# Patient Record
Sex: FEMALE | Race: WHITE | HISPANIC OR LATINO | Employment: UNEMPLOYED | URBAN - METROPOLITAN AREA
[De-identification: names, ages, dates, MRNs, and addresses within clinical notes are randomized per-mention and may not be internally consistent; named-entity substitution may affect disease eponyms.]

---

## 2021-09-16 ENCOUNTER — OFFICE VISIT (OUTPATIENT)
Dept: OBSTETRICS AND GYNECOLOGY | Facility: CLINIC | Age: 26
End: 2021-09-16
Payer: MEDICAID

## 2021-09-16 VITALS — WEIGHT: 136.69 LBS | DIASTOLIC BLOOD PRESSURE: 60 MMHG | SYSTOLIC BLOOD PRESSURE: 102 MMHG

## 2021-09-16 DIAGNOSIS — Z11.3 SCREENING FOR STD (SEXUALLY TRANSMITTED DISEASE): ICD-10-CM

## 2021-09-16 DIAGNOSIS — Z12.4 CERVICAL CANCER SCREENING: ICD-10-CM

## 2021-09-16 DIAGNOSIS — N91.2 AMENORRHEA: Primary | ICD-10-CM

## 2021-09-16 PROCEDURE — 88141 CYTOPATH C/V INTERPRET: CPT | Mod: ,,, | Performed by: PATHOLOGY

## 2021-09-16 PROCEDURE — 88141 PR  CYTOPATH CERV/VAG INTERPRET: ICD-10-PCS | Mod: ,,, | Performed by: PATHOLOGY

## 2021-09-16 PROCEDURE — 88175 CYTOPATH C/V AUTO FLUID REDO: CPT | Performed by: PATHOLOGY

## 2021-09-16 PROCEDURE — 99999 PR PBB SHADOW E&M-NEW PATIENT-LVL II: ICD-10-PCS | Mod: PBBFAC,,, | Performed by: OBSTETRICS & GYNECOLOGY

## 2021-09-16 PROCEDURE — 99202 OFFICE O/P NEW SF 15 MIN: CPT | Mod: PBBFAC,PO | Performed by: OBSTETRICS & GYNECOLOGY

## 2021-09-16 PROCEDURE — 87481 CANDIDA DNA AMP PROBE: CPT | Mod: 59 | Performed by: OBSTETRICS & GYNECOLOGY

## 2021-09-16 PROCEDURE — 87491 CHLMYD TRACH DNA AMP PROBE: CPT | Mod: 59 | Performed by: OBSTETRICS & GYNECOLOGY

## 2021-09-16 PROCEDURE — 99203 PR OFFICE/OUTPT VISIT, NEW, LEVL III, 30-44 MIN: ICD-10-PCS | Mod: S$PBB,,, | Performed by: OBSTETRICS & GYNECOLOGY

## 2021-09-16 PROCEDURE — 99203 OFFICE O/P NEW LOW 30 MIN: CPT | Mod: S$PBB,,, | Performed by: OBSTETRICS & GYNECOLOGY

## 2021-09-16 PROCEDURE — 87591 N.GONORRHOEAE DNA AMP PROB: CPT | Mod: 59 | Performed by: OBSTETRICS & GYNECOLOGY

## 2021-09-16 PROCEDURE — 87624 HPV HI-RISK TYP POOLED RSLT: CPT | Performed by: OBSTETRICS & GYNECOLOGY

## 2021-09-16 PROCEDURE — 87086 URINE CULTURE/COLONY COUNT: CPT | Performed by: OBSTETRICS & GYNECOLOGY

## 2021-09-16 PROCEDURE — 99999 PR PBB SHADOW E&M-NEW PATIENT-LVL II: CPT | Mod: PBBFAC,,, | Performed by: OBSTETRICS & GYNECOLOGY

## 2021-09-16 RX ORDER — PYRIDOXINE HCL (VITAMIN B6) 100 MG
100 TABLET ORAL
Qty: 60 TABLET | Refills: 3 | COMMUNITY
Start: 2021-09-16

## 2021-09-16 RX ORDER — METRONIDAZOLE 500 MG/1
500 TABLET ORAL EVERY 12 HOURS
Qty: 14 TABLET | Refills: 0 | Status: SHIPPED | OUTPATIENT
Start: 2021-09-16 | End: 2021-09-23

## 2021-09-16 RX ORDER — PROMETHAZINE HYDROCHLORIDE 25 MG/1
25 TABLET ORAL EVERY 6 HOURS PRN
Qty: 25 TABLET | Refills: 0 | Status: SHIPPED | OUTPATIENT
Start: 2021-09-16

## 2021-09-17 LAB
BACTERIA UR CULT: NORMAL
BACTERIAL VAGINOSIS DNA: POSITIVE
C TRACH DNA SPEC QL NAA+PROBE: NOT DETECTED
CANDIDA GLABRATA DNA: NEGATIVE
CANDIDA KRUSEI DNA: NEGATIVE
CANDIDA RRNA VAG QL PROBE: NEGATIVE
N GONORRHOEA DNA SPEC QL NAA+PROBE: NOT DETECTED
T VAGINALIS RRNA GENITAL QL PROBE: NEGATIVE

## 2021-09-20 ENCOUNTER — TELEPHONE (OUTPATIENT)
Dept: OBSTETRICS AND GYNECOLOGY | Facility: HOSPITAL | Age: 26
End: 2021-09-20

## 2021-09-20 RX ORDER — METRONIDAZOLE 500 MG/1
500 TABLET ORAL EVERY 12 HOURS
Qty: 14 TABLET | Refills: 0 | Status: SHIPPED | OUTPATIENT
Start: 2021-09-20 | End: 2021-09-27

## 2021-09-23 LAB
FINAL PATHOLOGIC DIAGNOSIS: ABNORMAL
Lab: ABNORMAL

## 2021-09-28 LAB
HPV HR 12 DNA SPEC QL NAA+PROBE: NEGATIVE
HPV16 AG SPEC QL: POSITIVE
HPV18 DNA SPEC QL NAA+PROBE: NEGATIVE

## 2021-10-07 ENCOUNTER — PROCEDURE VISIT (OUTPATIENT)
Dept: OBSTETRICS AND GYNECOLOGY | Facility: CLINIC | Age: 26
End: 2021-10-07
Payer: MEDICAID

## 2021-10-07 DIAGNOSIS — Z36.89 ESTABLISH GESTATIONAL AGE, ULTRASOUND: ICD-10-CM

## 2021-10-07 DIAGNOSIS — N91.2 AMENORRHEA: ICD-10-CM

## 2021-10-07 PROCEDURE — 76816 PR  US,PREGNANT UTERUS,F/U,TRANSABD APP: ICD-10-PCS | Mod: 26,S$PBB,, | Performed by: OBSTETRICS & GYNECOLOGY

## 2021-10-07 PROCEDURE — 76816 OB US FOLLOW-UP PER FETUS: CPT | Mod: 26,S$PBB,, | Performed by: OBSTETRICS & GYNECOLOGY

## 2021-10-07 PROCEDURE — 76816 OB US FOLLOW-UP PER FETUS: CPT | Mod: PBBFAC,PO | Performed by: OBSTETRICS & GYNECOLOGY

## 2021-10-14 ENCOUNTER — ROUTINE PRENATAL (OUTPATIENT)
Dept: OBSTETRICS AND GYNECOLOGY | Facility: CLINIC | Age: 26
End: 2021-10-14
Payer: MEDICAID

## 2021-10-14 ENCOUNTER — LAB VISIT (OUTPATIENT)
Dept: LAB | Facility: HOSPITAL | Age: 26
End: 2021-10-14
Attending: OBSTETRICS & GYNECOLOGY
Payer: MEDICAID

## 2021-10-14 VITALS — SYSTOLIC BLOOD PRESSURE: 112 MMHG | WEIGHT: 140 LBS | DIASTOLIC BLOOD PRESSURE: 80 MMHG

## 2021-10-14 DIAGNOSIS — N87.9 CERVICAL DYSPLASIA: Primary | ICD-10-CM

## 2021-10-14 DIAGNOSIS — N91.2 AMENORRHEA: ICD-10-CM

## 2021-10-14 DIAGNOSIS — Z3A.20 20 WEEKS GESTATION OF PREGNANCY: ICD-10-CM

## 2021-10-14 LAB
ABO + RH BLD: NORMAL
ANION GAP SERPL CALC-SCNC: 6 MMOL/L (ref 8–16)
BASOPHILS # BLD AUTO: 0.02 K/UL (ref 0–0.2)
BASOPHILS NFR BLD: 0.3 % (ref 0–1.9)
BLD GP AB SCN CELLS X3 SERPL QL: NORMAL
BUN SERPL-MCNC: 6 MG/DL (ref 6–20)
CALCIUM SERPL-MCNC: 9 MG/DL (ref 8.7–10.5)
CHLORIDE SERPL-SCNC: 106 MMOL/L (ref 95–110)
CO2 SERPL-SCNC: 24 MMOL/L (ref 23–29)
CREAT SERPL-MCNC: 0.6 MG/DL (ref 0.5–1.4)
DIFFERENTIAL METHOD: ABNORMAL
EOSINOPHIL # BLD AUTO: 0.1 K/UL (ref 0–0.5)
EOSINOPHIL NFR BLD: 1.6 % (ref 0–8)
ERYTHROCYTE [DISTWIDTH] IN BLOOD BY AUTOMATED COUNT: 14 % (ref 11.5–14.5)
EST. GFR  (AFRICAN AMERICAN): >60 ML/MIN/1.73 M^2
EST. GFR  (NON AFRICAN AMERICAN): >60 ML/MIN/1.73 M^2
ESTIMATED AVG GLUCOSE: 85 MG/DL (ref 68–131)
GLUCOSE SERPL-MCNC: 52 MG/DL (ref 70–110)
HBA1C MFR BLD: 4.6 % (ref 4–5.6)
HCT VFR BLD AUTO: 36.4 % (ref 37–48.5)
HGB BLD-MCNC: 12.6 G/DL (ref 12–16)
IMM GRANULOCYTES # BLD AUTO: 0.01 K/UL (ref 0–0.04)
IMM GRANULOCYTES NFR BLD AUTO: 0.1 % (ref 0–0.5)
LYMPHOCYTES # BLD AUTO: 1.3 K/UL (ref 1–4.8)
LYMPHOCYTES NFR BLD: 19 % (ref 18–48)
MCH RBC QN AUTO: 31.1 PG (ref 27–31)
MCHC RBC AUTO-ENTMCNC: 34.6 G/DL (ref 32–36)
MCV RBC AUTO: 90 FL (ref 82–98)
MONOCYTES # BLD AUTO: 0.8 K/UL (ref 0.3–1)
MONOCYTES NFR BLD: 11.5 % (ref 4–15)
NEUTROPHILS # BLD AUTO: 4.7 K/UL (ref 1.8–7.7)
NEUTROPHILS NFR BLD: 67.5 % (ref 38–73)
NRBC BLD-RTO: 0 /100 WBC
PLATELET # BLD AUTO: 278 K/UL (ref 150–450)
PMV BLD AUTO: 10.7 FL (ref 9.2–12.9)
POTASSIUM SERPL-SCNC: 3.9 MMOL/L (ref 3.5–5.1)
RBC # BLD AUTO: 4.05 M/UL (ref 4–5.4)
SODIUM SERPL-SCNC: 136 MMOL/L (ref 136–145)
WBC # BLD AUTO: 6.94 K/UL (ref 3.9–12.7)

## 2021-10-14 PROCEDURE — 99999 PR PBB SHADOW E&M-EST. PATIENT-LVL II: ICD-10-PCS | Mod: PBBFAC,,, | Performed by: OBSTETRICS & GYNECOLOGY

## 2021-10-14 PROCEDURE — 36415 COLL VENOUS BLD VENIPUNCTURE: CPT | Performed by: OBSTETRICS & GYNECOLOGY

## 2021-10-14 PROCEDURE — 88342 CHG IMMUNOCYTOCHEMISTRY: ICD-10-PCS | Mod: 26,,, | Performed by: PATHOLOGY

## 2021-10-14 PROCEDURE — 83020 HEMOGLOBIN ELECTROPHORESIS: CPT | Performed by: OBSTETRICS & GYNECOLOGY

## 2021-10-14 PROCEDURE — 57455 BIOPSY OF CERVIX W/SCOPE: CPT | Mod: S$PBB,,, | Performed by: OBSTETRICS & GYNECOLOGY

## 2021-10-14 PROCEDURE — 87340 HEPATITIS B SURFACE AG IA: CPT | Performed by: OBSTETRICS & GYNECOLOGY

## 2021-10-14 PROCEDURE — 88342 IMHCHEM/IMCYTCHM 1ST ANTB: CPT | Mod: 26,,, | Performed by: PATHOLOGY

## 2021-10-14 PROCEDURE — 88305 TISSUE EXAM BY PATHOLOGIST: ICD-10-PCS | Mod: 26,,, | Performed by: PATHOLOGY

## 2021-10-14 PROCEDURE — 87389 HIV-1 AG W/HIV-1&-2 AB AG IA: CPT | Performed by: OBSTETRICS & GYNECOLOGY

## 2021-10-14 PROCEDURE — 57455 BIOPSY OF CERVIX W/SCOPE: CPT | Mod: PBBFAC,PO | Performed by: OBSTETRICS & GYNECOLOGY

## 2021-10-14 PROCEDURE — 83036 HEMOGLOBIN GLYCOSYLATED A1C: CPT | Performed by: OBSTETRICS & GYNECOLOGY

## 2021-10-14 PROCEDURE — 80048 BASIC METABOLIC PNL TOTAL CA: CPT | Performed by: OBSTETRICS & GYNECOLOGY

## 2021-10-14 PROCEDURE — 99213 PR OFFICE/OUTPT VISIT, EST, LEVL III, 20-29 MIN: ICD-10-PCS | Mod: S$PBB,25,, | Performed by: OBSTETRICS & GYNECOLOGY

## 2021-10-14 PROCEDURE — 57455 PR COLPOSCOPY,CERVIX W/ADJ VAGINA,W/BX: ICD-10-PCS | Mod: S$PBB,,, | Performed by: OBSTETRICS & GYNECOLOGY

## 2021-10-14 PROCEDURE — 88342 IMHCHEM/IMCYTCHM 1ST ANTB: CPT | Performed by: PATHOLOGY

## 2021-10-14 PROCEDURE — 99212 OFFICE O/P EST SF 10 MIN: CPT | Mod: PBBFAC,PO,25 | Performed by: OBSTETRICS & GYNECOLOGY

## 2021-10-14 PROCEDURE — 88305 TISSUE EXAM BY PATHOLOGIST: CPT | Performed by: PATHOLOGY

## 2021-10-14 PROCEDURE — 99213 OFFICE O/P EST LOW 20 MIN: CPT | Mod: S$PBB,25,, | Performed by: OBSTETRICS & GYNECOLOGY

## 2021-10-14 PROCEDURE — 86592 SYPHILIS TEST NON-TREP QUAL: CPT | Performed by: OBSTETRICS & GYNECOLOGY

## 2021-10-14 PROCEDURE — 86900 BLOOD TYPING SEROLOGIC ABO: CPT | Performed by: OBSTETRICS & GYNECOLOGY

## 2021-10-14 PROCEDURE — 85025 COMPLETE CBC W/AUTO DIFF WBC: CPT | Performed by: OBSTETRICS & GYNECOLOGY

## 2021-10-14 PROCEDURE — 86762 RUBELLA ANTIBODY: CPT | Performed by: OBSTETRICS & GYNECOLOGY

## 2021-10-14 PROCEDURE — 88305 TISSUE EXAM BY PATHOLOGIST: CPT | Mod: 26,,, | Performed by: PATHOLOGY

## 2021-10-14 PROCEDURE — 99999 PR PBB SHADOW E&M-EST. PATIENT-LVL II: CPT | Mod: PBBFAC,,, | Performed by: OBSTETRICS & GYNECOLOGY

## 2021-10-14 PROCEDURE — 81220 CFTR GENE COM VARIANTS: CPT | Performed by: OBSTETRICS & GYNECOLOGY

## 2021-10-15 LAB
HBV SURFACE AG SERPL QL IA: NEGATIVE
HIV 1+2 AB+HIV1 P24 AG SERPL QL IA: NEGATIVE
RPR SER QL: NORMAL
RUBV IGG SER-ACNC: 120 IU/ML
RUBV IGG SER-IMP: REACTIVE

## 2021-10-18 LAB — CFTR MUT ANL BLD/T: NORMAL

## 2021-10-20 LAB
HGB A2 MFR BLD HPLC: 3.1 % (ref 2.2–3.2)
HGB FRACT BLD ELPH-IMP: NORMAL
HGB FRACT BLD ELPH-IMP: NORMAL

## 2021-10-22 LAB
COMMENT: NORMAL
FINAL PATHOLOGIC DIAGNOSIS: NORMAL
GROSS: NORMAL
Lab: NORMAL

## 2021-11-03 ENCOUNTER — TELEPHONE (OUTPATIENT)
Dept: OBSTETRICS AND GYNECOLOGY | Facility: CLINIC | Age: 26
End: 2021-11-03
Payer: MEDICAID

## 2021-11-04 ENCOUNTER — PROCEDURE VISIT (OUTPATIENT)
Dept: OBSTETRICS AND GYNECOLOGY | Facility: CLINIC | Age: 26
End: 2021-11-04
Payer: MEDICAID

## 2021-11-04 DIAGNOSIS — Z3A.20 20 WEEKS GESTATION OF PREGNANCY: ICD-10-CM

## 2021-11-04 PROCEDURE — 76805 OB US >/= 14 WKS SNGL FETUS: CPT | Mod: PBBFAC,PO | Performed by: OBSTETRICS & GYNECOLOGY

## 2021-11-11 ENCOUNTER — ROUTINE PRENATAL (OUTPATIENT)
Dept: OBSTETRICS AND GYNECOLOGY | Facility: CLINIC | Age: 26
End: 2021-11-11
Payer: MEDICAID

## 2021-11-11 VITALS — DIASTOLIC BLOOD PRESSURE: 68 MMHG | WEIGHT: 143.94 LBS | SYSTOLIC BLOOD PRESSURE: 110 MMHG

## 2021-11-11 DIAGNOSIS — N87.9 CERVICAL DYSPLASIA: ICD-10-CM

## 2021-11-11 DIAGNOSIS — Z3A.21 21 WEEKS GESTATION OF PREGNANCY: Primary | ICD-10-CM

## 2021-11-11 PROCEDURE — 99999 PR PBB SHADOW E&M-EST. PATIENT-LVL II: CPT | Mod: PBBFAC,,, | Performed by: OBSTETRICS & GYNECOLOGY

## 2021-11-11 PROCEDURE — 99213 PR OFFICE/OUTPT VISIT, EST, LEVL III, 20-29 MIN: ICD-10-PCS | Mod: S$PBB,TH,, | Performed by: OBSTETRICS & GYNECOLOGY

## 2021-11-11 PROCEDURE — 99999 PR PBB SHADOW E&M-EST. PATIENT-LVL II: ICD-10-PCS | Mod: PBBFAC,,, | Performed by: OBSTETRICS & GYNECOLOGY

## 2021-11-11 PROCEDURE — 99213 OFFICE O/P EST LOW 20 MIN: CPT | Mod: S$PBB,TH,, | Performed by: OBSTETRICS & GYNECOLOGY

## 2021-11-11 PROCEDURE — 99212 OFFICE O/P EST SF 10 MIN: CPT | Mod: PBBFAC,PO | Performed by: OBSTETRICS & GYNECOLOGY

## 2021-11-15 ENCOUNTER — TELEPHONE (OUTPATIENT)
Dept: OBSTETRICS AND GYNECOLOGY | Facility: CLINIC | Age: 26
End: 2021-11-15
Payer: MEDICAID

## 2021-12-09 ENCOUNTER — TELEPHONE (OUTPATIENT)
Dept: OBSTETRICS AND GYNECOLOGY | Facility: CLINIC | Age: 26
End: 2021-12-09
Payer: MEDICAID

## 2021-12-09 ENCOUNTER — ROUTINE PRENATAL (OUTPATIENT)
Dept: OBSTETRICS AND GYNECOLOGY | Facility: CLINIC | Age: 26
End: 2021-12-09
Payer: MEDICAID

## 2021-12-09 VITALS — SYSTOLIC BLOOD PRESSURE: 120 MMHG | WEIGHT: 148.56 LBS | DIASTOLIC BLOOD PRESSURE: 60 MMHG

## 2021-12-09 DIAGNOSIS — Z3A.24 24 WEEKS GESTATION OF PREGNANCY: ICD-10-CM

## 2021-12-09 DIAGNOSIS — Z3A.28 28 WEEKS GESTATION OF PREGNANCY: Primary | ICD-10-CM

## 2021-12-09 DIAGNOSIS — N87.9 CERVICAL DYSPLASIA: ICD-10-CM

## 2021-12-09 DIAGNOSIS — Z34.82 ENCOUNTER FOR SUPERVISION OF OTHER NORMAL PREGNANCY IN SECOND TRIMESTER: ICD-10-CM

## 2021-12-09 DIAGNOSIS — Z3A.25 25 WEEKS GESTATION OF PREGNANCY: Primary | ICD-10-CM

## 2021-12-09 PROCEDURE — 99999 PR PBB SHADOW E&M-EST. PATIENT-LVL II: ICD-10-PCS | Mod: PBBFAC,,, | Performed by: OBSTETRICS & GYNECOLOGY

## 2021-12-09 PROCEDURE — 99999 PR PBB SHADOW E&M-EST. PATIENT-LVL II: CPT | Mod: PBBFAC,,, | Performed by: OBSTETRICS & GYNECOLOGY

## 2021-12-09 PROCEDURE — 99213 PR OFFICE/OUTPT VISIT, EST, LEVL III, 20-29 MIN: ICD-10-PCS | Mod: S$PBB,TH,, | Performed by: OBSTETRICS & GYNECOLOGY

## 2021-12-09 PROCEDURE — 99212 OFFICE O/P EST SF 10 MIN: CPT | Mod: PBBFAC,TH,PO | Performed by: OBSTETRICS & GYNECOLOGY

## 2021-12-09 PROCEDURE — 99213 OFFICE O/P EST LOW 20 MIN: CPT | Mod: S$PBB,TH,, | Performed by: OBSTETRICS & GYNECOLOGY

## 2022-01-06 ENCOUNTER — CLINICAL SUPPORT (OUTPATIENT)
Dept: OBSTETRICS AND GYNECOLOGY | Facility: CLINIC | Age: 27
End: 2022-01-06
Payer: MEDICAID

## 2022-01-06 ENCOUNTER — PROCEDURE VISIT (OUTPATIENT)
Dept: OBSTETRICS AND GYNECOLOGY | Facility: CLINIC | Age: 27
End: 2022-01-06
Payer: MEDICAID

## 2022-01-06 ENCOUNTER — LAB VISIT (OUTPATIENT)
Dept: LAB | Facility: HOSPITAL | Age: 27
End: 2022-01-06
Attending: OBSTETRICS & GYNECOLOGY
Payer: MEDICAID

## 2022-01-06 ENCOUNTER — ROUTINE PRENATAL (OUTPATIENT)
Dept: OBSTETRICS AND GYNECOLOGY | Facility: CLINIC | Age: 27
End: 2022-01-06
Payer: MEDICAID

## 2022-01-06 VITALS — DIASTOLIC BLOOD PRESSURE: 70 MMHG | SYSTOLIC BLOOD PRESSURE: 104 MMHG | WEIGHT: 157.63 LBS

## 2022-01-06 DIAGNOSIS — N87.9 CERVICAL DYSPLASIA: ICD-10-CM

## 2022-01-06 DIAGNOSIS — Z3A.29 29 WEEKS GESTATION OF PREGNANCY: Primary | ICD-10-CM

## 2022-01-06 DIAGNOSIS — Z23 NEED FOR TETANUS, DIPHTHERIA, AND ACELLULAR PERTUSSIS (TDAP) VACCINE: Primary | ICD-10-CM

## 2022-01-06 DIAGNOSIS — Z3A.25 25 WEEKS GESTATION OF PREGNANCY: ICD-10-CM

## 2022-01-06 DIAGNOSIS — Z3A.28 28 WEEKS GESTATION OF PREGNANCY: ICD-10-CM

## 2022-01-06 DIAGNOSIS — Z34.82 ENCOUNTER FOR SUPERVISION OF OTHER NORMAL PREGNANCY IN SECOND TRIMESTER: ICD-10-CM

## 2022-01-06 LAB — GLUCOSE SERPL-MCNC: 70 MG/DL (ref 70–140)

## 2022-01-06 PROCEDURE — 99213 OFFICE O/P EST LOW 20 MIN: CPT | Mod: S$PBB,TH,, | Performed by: OBSTETRICS & GYNECOLOGY

## 2022-01-06 PROCEDURE — 36415 COLL VENOUS BLD VENIPUNCTURE: CPT | Performed by: OBSTETRICS & GYNECOLOGY

## 2022-01-06 PROCEDURE — 76816 OB US FOLLOW-UP PER FETUS: CPT | Mod: PBBFAC,PO | Performed by: OBSTETRICS & GYNECOLOGY

## 2022-01-06 PROCEDURE — 90715 TDAP VACCINE 7 YRS/> IM: CPT | Mod: PBBFAC,PO

## 2022-01-06 PROCEDURE — 99999 PR PBB SHADOW E&M-EST. PATIENT-LVL II: ICD-10-PCS | Mod: PBBFAC,,, | Performed by: OBSTETRICS & GYNECOLOGY

## 2022-01-06 PROCEDURE — 82950 GLUCOSE TEST: CPT | Performed by: OBSTETRICS & GYNECOLOGY

## 2022-01-06 PROCEDURE — 76816 US OB/GYN PROCEDURE (VIEWPOINT): ICD-10-PCS | Mod: 26,S$PBB,, | Performed by: OBSTETRICS & GYNECOLOGY

## 2022-01-06 PROCEDURE — 99212 OFFICE O/P EST SF 10 MIN: CPT | Mod: PBBFAC,TH,PO | Performed by: OBSTETRICS & GYNECOLOGY

## 2022-01-06 PROCEDURE — 99213 PR OFFICE/OUTPT VISIT, EST, LEVL III, 20-29 MIN: ICD-10-PCS | Mod: S$PBB,TH,, | Performed by: OBSTETRICS & GYNECOLOGY

## 2022-01-06 PROCEDURE — 99999 PR PBB SHADOW E&M-EST. PATIENT-LVL II: CPT | Mod: PBBFAC,,, | Performed by: OBSTETRICS & GYNECOLOGY

## 2022-01-06 NOTE — PROGRESS NOTES
Patient given TDAP in LEFT deltoid. [place] VIS given. Patient asked to wait 15 min in the lobby to monitor for adverse reaction. Patient verbalized understanding. Patient has received immunizations in the past without any complications.     Int. ID #[id number] ([id number]) with language services translated for this injection appointment.

## 2022-01-06 NOTE — PROGRESS NOTES
No complaints today   No nausea or vomiting   Denies vaginal bleeding or cramping     /70   Wt 71.5 kg (157 lb 10.1 oz)   LMP 07/15/2021 (Exact Date)     Prenatal labs reviewed  Aneuploidy screen  : offered  / declined ( - )    Anatomy US at 20 weeks   Consents=signed   Diabetes screen=pending   TdaP=given   GBBS @34-35W=   Growth US @ 36W  BC=       General Pregnancy  recommendations given  PNV + H2O intake    FU in   (x)  weeks     Discussed nonpharmacological pain relief methods for labor, techniques and benefits of effective breastfeeding position and latch, and basic breastfeeding management. Encouraged patient to attend [facility] Pren[facility] and to download [facility]  mobile josesito if she has not already done so. Patient verbalizes understanding.     I spent a total of 30 minutes on the day of the visit.This includes face to face time and non-face to face time preparing to see the patient (eg, review of tests), Obtaining and/or reviewing separately obtained history, Documenting clinical information in the electronic or other health record, Independently interpreting resultsand communicating results to the patient/family/caregiver, or Care coordination.          Woody Bobo M.D.   OB/GYN

## 2022-01-10 ENCOUNTER — TELEPHONE (OUTPATIENT)
Dept: OBSTETRICS AND GYNECOLOGY | Facility: CLINIC | Age: 27
End: 2022-01-10
Payer: MEDICAID

## 2022-01-10 NOTE — TELEPHONE ENCOUNTER
----- Message from Woody Bobo MD sent at 1/9/2022  7:51 PM CST -----  Normal Ob glucose screen  Please inform patient    RTC as scheduled    Woody Bobo M.D.   OB/GYN

## 2022-01-20 ENCOUNTER — TELEPHONE (OUTPATIENT)
Dept: OBSTETRICS AND GYNECOLOGY | Facility: CLINIC | Age: 27
End: 2022-01-20
Payer: MEDICAID

## 2022-01-20 ENCOUNTER — ROUTINE PRENATAL (OUTPATIENT)
Dept: OBSTETRICS AND GYNECOLOGY | Facility: CLINIC | Age: 27
End: 2022-01-20
Payer: MEDICAID

## 2022-01-20 VITALS — SYSTOLIC BLOOD PRESSURE: 84 MMHG | WEIGHT: 161.38 LBS | DIASTOLIC BLOOD PRESSURE: 50 MMHG

## 2022-01-20 DIAGNOSIS — Z3A.35 35 WEEKS GESTATION OF PREGNANCY: Primary | ICD-10-CM

## 2022-01-20 DIAGNOSIS — Z3A.31 31 WEEKS GESTATION OF PREGNANCY: Primary | ICD-10-CM

## 2022-01-20 DIAGNOSIS — Z34.83 ENCOUNTER FOR SUPERVISION OF OTHER NORMAL PREGNANCY IN THIRD TRIMESTER: ICD-10-CM

## 2022-01-20 PROCEDURE — 99213 PR OFFICE/OUTPT VISIT, EST, LEVL III, 20-29 MIN: ICD-10-PCS | Mod: S$PBB,TH,, | Performed by: OBSTETRICS & GYNECOLOGY

## 2022-01-20 PROCEDURE — 99999 PR PBB SHADOW E&M-EST. PATIENT-LVL II: CPT | Mod: PBBFAC,,, | Performed by: OBSTETRICS & GYNECOLOGY

## 2022-01-20 PROCEDURE — 99212 OFFICE O/P EST SF 10 MIN: CPT | Mod: PBBFAC,TH,PO | Performed by: OBSTETRICS & GYNECOLOGY

## 2022-01-20 PROCEDURE — 99999 PR PBB SHADOW E&M-EST. PATIENT-LVL II: ICD-10-PCS | Mod: PBBFAC,,, | Performed by: OBSTETRICS & GYNECOLOGY

## 2022-01-20 PROCEDURE — 99213 OFFICE O/P EST LOW 20 MIN: CPT | Mod: S$PBB,TH,, | Performed by: OBSTETRICS & GYNECOLOGY

## 2022-01-20 NOTE — PROGRESS NOTES
No complaints today   No nausea or vomiting   Denies vaginal bleeding or cramping     BP (!) 84/50   Wt 73.2 kg (161 lb 6 oz)   LMP 07/15/2021 (Exact Date)     Prenatal labs reviewed  Aneuploidy screen  : offered  / declined ( - )    Anatomy US at 20 weeks   Consents=signed   Diabetes screen=negative   TdaP=given   GBBS @34-35W=   Growth US @ 36W  BC=       General Pregnancy  recommendations given  PNV + H2O intake    FU in  2 (x)  weeks     Discussed nonpharmacological pain relief methods for labor, techniques and benefits of effective breastfeeding position and latch, and basic breastfeeding management. Encouraged patient to attend [facility] P[facility] and to download [facility]  mobile josesito if she has not already done so. Patient verbalizes understanding.     I spent a total of 30 minutes on the day of the visit.This includes face to face time and non-face to face time preparing to see the patient (eg, review of tests), Obtaining and/or reviewing separately obtained history, Documenting clinical information in the electronic or other health record, Independently interpreting resultsand communicating results to the patient/family/caregiver, or Care coordination.          Woody Bobo M.D.   OB/GYN

## 2022-01-25 ENCOUNTER — TELEPHONE (OUTPATIENT)
Dept: OBSTETRICS AND GYNECOLOGY | Facility: CLINIC | Age: 27
End: 2022-01-25
Payer: MEDICAID

## 2022-01-25 DIAGNOSIS — Z3A.35 35 WEEKS GESTATION OF PREGNANCY: Primary | ICD-10-CM

## 2022-02-03 ENCOUNTER — ROUTINE PRENATAL (OUTPATIENT)
Dept: OBSTETRICS AND GYNECOLOGY | Facility: CLINIC | Age: 27
End: 2022-02-03
Payer: MEDICAID

## 2022-02-03 VITALS — WEIGHT: 163.38 LBS | DIASTOLIC BLOOD PRESSURE: 80 MMHG | SYSTOLIC BLOOD PRESSURE: 110 MMHG

## 2022-02-03 DIAGNOSIS — Z3A.35 35 WEEKS GESTATION OF PREGNANCY: Primary | ICD-10-CM

## 2022-02-03 DIAGNOSIS — Z34.83 ENCOUNTER FOR SUPERVISION OF OTHER NORMAL PREGNANCY IN THIRD TRIMESTER: ICD-10-CM

## 2022-02-03 PROCEDURE — 99213 PR OFFICE/OUTPT VISIT, EST, LEVL III, 20-29 MIN: ICD-10-PCS | Mod: S$PBB,TH,, | Performed by: OBSTETRICS & GYNECOLOGY

## 2022-02-03 PROCEDURE — 99213 OFFICE O/P EST LOW 20 MIN: CPT | Mod: S$PBB,TH,, | Performed by: OBSTETRICS & GYNECOLOGY

## 2022-02-03 PROCEDURE — 99999 PR PBB SHADOW E&M-EST. PATIENT-LVL II: ICD-10-PCS | Mod: PBBFAC,,, | Performed by: OBSTETRICS & GYNECOLOGY

## 2022-02-03 PROCEDURE — 99999 PR PBB SHADOW E&M-EST. PATIENT-LVL II: CPT | Mod: PBBFAC,,, | Performed by: OBSTETRICS & GYNECOLOGY

## 2022-02-03 PROCEDURE — 99212 OFFICE O/P EST SF 10 MIN: CPT | Mod: PBBFAC,TH,PO | Performed by: OBSTETRICS & GYNECOLOGY

## 2022-02-03 NOTE — PROGRESS NOTES
No complaints today   No nausea or vomiting   Denies vaginal bleeding or cramping     /80   Wt 74.1 kg (163 lb 5.8 oz)   LMP 07/15/2021 (Exact Date)     Prenatal labs reviewed  Aneuploidy screen  : offered  / declined ( - )    Anatomy US at 20 weeks   Consents=signed   Diabetes screen=negative   TdaP=given   GBBS @34-35W=   Growth US @ 36W  BC=       General Pregnancy  recommendations given  PNV + H2O intake    FU in  2 (x)  weeks     Discussed nonpharmacological pain relief methods for labor, techniques and benefits of effective breastfeeding position and latch, and basic breastfeeding management. Encouraged patient to attend [facility] P[facility] and to download [facility]  mobile josesito if she has not already done so. Patient verbalizes understanding.     I spent a total of 30 minutes on the day of the visit.This includes face to face time and non-face to face time preparing to see the patient (eg, review of tests), Obtaining and/or reviewing separately obtained history, Documenting clinical information in the electronic or other health record, Independently interpreting resultsand communicating results to the patient/family/caregiver, or Care coordination.          Woody Bobo M.D.   OB/GYN

## 2022-02-08 ENCOUNTER — TELEPHONE (OUTPATIENT)
Dept: OBSTETRICS AND GYNECOLOGY | Facility: CLINIC | Age: 27
End: 2022-02-08
Payer: MEDICAID

## 2022-02-08 DIAGNOSIS — Z3A.35 35 WEEKS GESTATION OF PREGNANCY: Primary | ICD-10-CM

## 2022-02-17 ENCOUNTER — LAB VISIT (OUTPATIENT)
Dept: LAB | Facility: HOSPITAL | Age: 27
End: 2022-02-17
Attending: OBSTETRICS & GYNECOLOGY
Payer: MEDICAID

## 2022-02-17 ENCOUNTER — TELEPHONE (OUTPATIENT)
Dept: OBSTETRICS AND GYNECOLOGY | Facility: CLINIC | Age: 27
End: 2022-02-17

## 2022-02-17 ENCOUNTER — ROUTINE PRENATAL (OUTPATIENT)
Dept: OBSTETRICS AND GYNECOLOGY | Facility: CLINIC | Age: 27
End: 2022-02-17
Payer: MEDICAID

## 2022-02-17 VITALS — SYSTOLIC BLOOD PRESSURE: 110 MMHG | WEIGHT: 165.81 LBS | DIASTOLIC BLOOD PRESSURE: 70 MMHG

## 2022-02-17 DIAGNOSIS — Z3A.35 35 WEEKS GESTATION OF PREGNANCY: ICD-10-CM

## 2022-02-17 DIAGNOSIS — Z3A.35 35 WEEKS GESTATION OF PREGNANCY: Primary | ICD-10-CM

## 2022-02-17 DIAGNOSIS — Z34.83 ENCOUNTER FOR SUPERVISION OF OTHER NORMAL PREGNANCY IN THIRD TRIMESTER: ICD-10-CM

## 2022-02-17 LAB
BASOPHILS # BLD AUTO: 0.02 K/UL (ref 0–0.2)
BASOPHILS NFR BLD: 0.2 % (ref 0–1.9)
DIFFERENTIAL METHOD: ABNORMAL
EOSINOPHIL # BLD AUTO: 0.1 K/UL (ref 0–0.5)
EOSINOPHIL NFR BLD: 0.8 % (ref 0–8)
ERYTHROCYTE [DISTWIDTH] IN BLOOD BY AUTOMATED COUNT: 14.4 % (ref 11.5–14.5)
HCT VFR BLD AUTO: 33.9 % (ref 37–48.5)
HGB BLD-MCNC: 11.3 G/DL (ref 12–16)
IMM GRANULOCYTES # BLD AUTO: 0.03 K/UL (ref 0–0.04)
IMM GRANULOCYTES NFR BLD AUTO: 0.3 % (ref 0–0.5)
LYMPHOCYTES # BLD AUTO: 1.3 K/UL (ref 1–4.8)
LYMPHOCYTES NFR BLD: 15.6 % (ref 18–48)
MCH RBC QN AUTO: 29.6 PG (ref 27–31)
MCHC RBC AUTO-ENTMCNC: 33.3 G/DL (ref 32–36)
MCV RBC AUTO: 89 FL (ref 82–98)
MONOCYTES # BLD AUTO: 0.9 K/UL (ref 0.3–1)
MONOCYTES NFR BLD: 9.9 % (ref 4–15)
NEUTROPHILS # BLD AUTO: 6.3 K/UL (ref 1.8–7.7)
NEUTROPHILS NFR BLD: 73.2 % (ref 38–73)
NRBC BLD-RTO: 0 /100 WBC
PLATELET # BLD AUTO: 289 K/UL (ref 150–450)
PMV BLD AUTO: 10.8 FL (ref 9.2–12.9)
RBC # BLD AUTO: 3.82 M/UL (ref 4–5.4)
WBC # BLD AUTO: 8.59 K/UL (ref 3.9–12.7)

## 2022-02-17 PROCEDURE — 99212 OFFICE O/P EST SF 10 MIN: CPT | Mod: PBBFAC,TH,PO | Performed by: OBSTETRICS & GYNECOLOGY

## 2022-02-17 PROCEDURE — 86592 SYPHILIS TEST NON-TREP QUAL: CPT | Performed by: OBSTETRICS & GYNECOLOGY

## 2022-02-17 PROCEDURE — 99213 OFFICE O/P EST LOW 20 MIN: CPT | Mod: S$PBB,TH,, | Performed by: OBSTETRICS & GYNECOLOGY

## 2022-02-17 PROCEDURE — 87081 CULTURE SCREEN ONLY: CPT | Performed by: OBSTETRICS & GYNECOLOGY

## 2022-02-17 PROCEDURE — 85025 COMPLETE CBC W/AUTO DIFF WBC: CPT | Performed by: OBSTETRICS & GYNECOLOGY

## 2022-02-17 PROCEDURE — 80074 ACUTE HEPATITIS PANEL: CPT | Performed by: OBSTETRICS & GYNECOLOGY

## 2022-02-17 PROCEDURE — 36415 COLL VENOUS BLD VENIPUNCTURE: CPT | Performed by: OBSTETRICS & GYNECOLOGY

## 2022-02-17 PROCEDURE — 99999 PR PBB SHADOW E&M-EST. PATIENT-LVL II: ICD-10-PCS | Mod: PBBFAC,,, | Performed by: OBSTETRICS & GYNECOLOGY

## 2022-02-17 PROCEDURE — 99999 PR PBB SHADOW E&M-EST. PATIENT-LVL II: CPT | Mod: PBBFAC,,, | Performed by: OBSTETRICS & GYNECOLOGY

## 2022-02-17 PROCEDURE — 87389 HIV-1 AG W/HIV-1&-2 AB AG IA: CPT | Performed by: OBSTETRICS & GYNECOLOGY

## 2022-02-17 PROCEDURE — 99213 PR OFFICE/OUTPT VISIT, EST, LEVL III, 20-29 MIN: ICD-10-PCS | Mod: S$PBB,TH,, | Performed by: OBSTETRICS & GYNECOLOGY

## 2022-02-17 NOTE — PROGRESS NOTES
No complaints today   No nausea or vomiting   Denies vaginal bleeding or cramping     /70   Wt 75.2 kg (165 lb 12.6 oz)   LMP 07/15/2021 (Exact Date)     Prenatal labs reviewed  Aneuploidy screen  : offered  / declined ( - )    Anatomy US at 20 weeks   Consents=signed   Diabetes screen=negative   TdaP=given   GBBS @34-35W=   Growth US @ 36W  BC=       General Pregnancy  recommendations given  PNV + H2O intake    FU in  2 (x)  weeks     Discussed nonpharmacological pain relief methods for labor, techniques and benefits of effective breastfeeding position and latch, and basic breastfeeding management. Encouraged patient to attend [facility] and to download [facility]  mobile josesito if she has not already done so. Patient verbalizes understanding.     I spent a total of 30 minutes on the day of the visit.This includes face to face time and non-face to face time preparing to see the patient (eg, review of tests), Obtaining and/or reviewing separately obtained history, Documenting clinical information in the electronic or other health record, Independently interpreting resultsand communicating results to the patient/family/caregiver, or Care coordination.          Woody Bobo M.D.   OB/GYN

## 2022-02-18 LAB
HAV IGM SERPL QL IA: NEGATIVE
HBV CORE IGM SERPL QL IA: NEGATIVE
HBV SURFACE AG SERPL QL IA: NEGATIVE
HCV AB SERPL QL IA: NEGATIVE
RPR SER QL: NORMAL

## 2022-02-21 ENCOUNTER — PROCEDURE VISIT (OUTPATIENT)
Dept: OBSTETRICS AND GYNECOLOGY | Facility: CLINIC | Age: 27
End: 2022-02-21
Payer: MEDICAID

## 2022-02-21 DIAGNOSIS — Z3A.35 35 WEEKS GESTATION OF PREGNANCY: ICD-10-CM

## 2022-02-21 LAB
BACTERIA SPEC AEROBE CULT: NORMAL
HIV 1+2 AB+HIV1 P24 AG SERPL QL IA: NEGATIVE

## 2022-02-21 PROCEDURE — 76816 OB US FOLLOW-UP PER FETUS: CPT | Mod: PBBFAC,PO | Performed by: OBSTETRICS & GYNECOLOGY

## 2022-02-21 PROCEDURE — 76816 US OB/GYN PROCEDURE (VIEWPOINT): ICD-10-PCS | Mod: 26,S$PBB,Q6, | Performed by: OBSTETRICS & GYNECOLOGY

## 2022-02-21 PROCEDURE — 76816 OB US FOLLOW-UP PER FETUS: CPT | Mod: 26,S$PBB,Q6, | Performed by: OBSTETRICS & GYNECOLOGY

## 2022-02-24 ENCOUNTER — ROUTINE PRENATAL (OUTPATIENT)
Dept: OBSTETRICS AND GYNECOLOGY | Facility: CLINIC | Age: 27
End: 2022-02-24
Payer: MEDICAID

## 2022-02-24 VITALS — DIASTOLIC BLOOD PRESSURE: 62 MMHG | WEIGHT: 169.75 LBS | SYSTOLIC BLOOD PRESSURE: 100 MMHG

## 2022-02-24 DIAGNOSIS — Z3A.36 36 WEEKS GESTATION OF PREGNANCY: Primary | ICD-10-CM

## 2022-02-24 PROCEDURE — 99999 PR PBB SHADOW E&M-EST. PATIENT-LVL II: ICD-10-PCS | Mod: PBBFAC,,, | Performed by: OBSTETRICS & GYNECOLOGY

## 2022-02-24 PROCEDURE — 99213 PR OFFICE/OUTPT VISIT, EST, LEVL III, 20-29 MIN: ICD-10-PCS | Mod: TH,S$PBB,, | Performed by: OBSTETRICS & GYNECOLOGY

## 2022-02-24 PROCEDURE — 99212 OFFICE O/P EST SF 10 MIN: CPT | Mod: PBBFAC,PO | Performed by: OBSTETRICS & GYNECOLOGY

## 2022-02-24 PROCEDURE — 99999 PR PBB SHADOW E&M-EST. PATIENT-LVL II: CPT | Mod: PBBFAC,,, | Performed by: OBSTETRICS & GYNECOLOGY

## 2022-02-24 PROCEDURE — 99213 OFFICE O/P EST LOW 20 MIN: CPT | Mod: TH,S$PBB,, | Performed by: OBSTETRICS & GYNECOLOGY

## 2022-02-25 NOTE — PROGRESS NOTES
No complaints today   No nausea or vomiting   Denies vaginal bleeding or cramping     /62   Wt 77 kg (169 lb 12.1 oz)   LMP 07/15/2021 (Exact Date)     Prenatal labs reviewed  Aneuploidy screen  : offered  / declined ( - )    Anatomy US at 20 weeks   Consents=signed   Diabetes screen=negative   TdaP=given   GBBS @34-35W=  Neg  Growth US @ 36W  BC=       General Pregnancy  recommendations given  PNV + H2O intake    FU in 1w   Discussed nonpharmacological pain relief methods for labor, techniques and benefits of effective breastfeeding position and latch, and basic breastfeeding management. Encouraged patient to attend [facility] and to download [facility]  mobile josesito if she has not already done so. Patient verbalizes understanding.     I spent a total of 30 minutes on the day of the visit.This includes face to face time and non-face to face time preparing to see the patient (eg, review of tests), Obtaining and/or reviewing separately obtained history, Documenting clinical information in the electronic or other health record, Independently interpreting resultsand communicating results to the patient/family/caregiver, or Care coordination.          Woody Bobo M.D.   OB/GYN

## 2022-02-28 ENCOUNTER — TELEPHONE (OUTPATIENT)
Dept: OBSTETRICS AND GYNECOLOGY | Facility: CLINIC | Age: 27
End: 2022-02-28
Payer: MEDICAID

## 2022-02-28 NOTE — TELEPHONE ENCOUNTER
----- Message from Fco Mccabe sent at 2/25/2022  5:04 PM CST -----  Contact: pt  .Type:  Needs Medical Advice    Who Called: pt    Would the patient rather a call back or a response via [facility]? Call back  Best Call Back Number: [phone]  Additional Information: Pt. Is requesting a call back from [place]

## 2022-02-28 NOTE — TELEPHONE ENCOUNTER
----- Message from Maia Cyr sent at 2/25/2022  2:19 PM CST -----  Type:  Needs Medical Advice    Who Called: pt  Symptoms (please be specific): pt would like to speak to siobhan from office   How long has patient had these symptoms:  pt is pregnant is wanting to get Auth.to be induced      Would the patient rather a call back or a response via [facility]? Please call  Best Call Back Number: [phone]  Additional Information:

## 2022-03-03 ENCOUNTER — ROUTINE PRENATAL (OUTPATIENT)
Dept: OBSTETRICS AND GYNECOLOGY | Facility: CLINIC | Age: 27
End: 2022-03-03
Payer: MEDICAID

## 2022-03-03 VITALS — SYSTOLIC BLOOD PRESSURE: 108 MMHG | WEIGHT: 171.5 LBS | DIASTOLIC BLOOD PRESSURE: 70 MMHG

## 2022-03-03 DIAGNOSIS — Z34.83 ENCOUNTER FOR SUPERVISION OF OTHER NORMAL PREGNANCY IN THIRD TRIMESTER: Primary | ICD-10-CM

## 2022-03-03 PROCEDURE — 99999 PR PBB SHADOW E&M-EST. PATIENT-LVL II: ICD-10-PCS | Mod: PBBFAC,,, | Performed by: OBSTETRICS & GYNECOLOGY

## 2022-03-03 PROCEDURE — 99213 OFFICE O/P EST LOW 20 MIN: CPT | Mod: TH,S$PBB,, | Performed by: OBSTETRICS & GYNECOLOGY

## 2022-03-03 PROCEDURE — 99213 PR OFFICE/OUTPT VISIT, EST, LEVL III, 20-29 MIN: ICD-10-PCS | Mod: TH,S$PBB,, | Performed by: OBSTETRICS & GYNECOLOGY

## 2022-03-03 PROCEDURE — 99999 PR PBB SHADOW E&M-EST. PATIENT-LVL II: CPT | Mod: PBBFAC,,, | Performed by: OBSTETRICS & GYNECOLOGY

## 2022-03-03 PROCEDURE — 99212 OFFICE O/P EST SF 10 MIN: CPT | Mod: PBBFAC,TH,PO | Performed by: OBSTETRICS & GYNECOLOGY

## 2022-03-03 NOTE — PROGRESS NOTES
HPI:   26 y.o.      ROS:  GENERAL: Denies weight gain or weight loss. Feeling well overall. +FM. No LOF  HEAD: No headache.   ABDOMEN: No abdominal pain, constipation, diarrhea, nausea, vomiting.   URINARY: No frequency, dysuria, hematuria, or burning on urination.  EXTREMITIES: No swelling     Patient with no complaints. Denies vaginal bleeding or contractions. Good FM. Growth scan and other ultrasounds reviewed.     Vitals signs, FHTs, urine dip, and PE findings documented, reviewed and available in OB flow chart.    I spent a total of 15+ minutes on the day of the visit. This includes face to face time and non-face to face time preparing to see the patient (eg, review of tests), Obtaining and/or reviewing separately obtained history, Documenting clinical information in the electronic or other health record, Independently interpreting resultsand communicating results to the patient/family/caregiver, or Care coordination.      ASSESSMENT:  37w2d  FMC BID  Labor and bleeding precautions    PLAN:  RTO 1 weeks with Dr. Bobo

## 2022-03-07 ENCOUNTER — ROUTINE PRENATAL (OUTPATIENT)
Dept: OBSTETRICS AND GYNECOLOGY | Facility: CLINIC | Age: 27
End: 2022-03-07
Payer: MEDICAID

## 2022-03-07 VITALS — DIASTOLIC BLOOD PRESSURE: 80 MMHG | SYSTOLIC BLOOD PRESSURE: 110 MMHG | WEIGHT: 171.06 LBS

## 2022-03-07 DIAGNOSIS — Z34.83 ENCOUNTER FOR SUPERVISION OF OTHER NORMAL PREGNANCY IN THIRD TRIMESTER: ICD-10-CM

## 2022-03-07 DIAGNOSIS — N87.9 CERVICAL DYSPLASIA: ICD-10-CM

## 2022-03-07 DIAGNOSIS — Z3A.37 37 WEEKS GESTATION OF PREGNANCY: Primary | ICD-10-CM

## 2022-03-07 PROCEDURE — 99212 OFFICE O/P EST SF 10 MIN: CPT | Mod: PBBFAC,TH,PO | Performed by: OBSTETRICS & GYNECOLOGY

## 2022-03-07 PROCEDURE — 99999 PR PBB SHADOW E&M-EST. PATIENT-LVL II: ICD-10-PCS | Mod: PBBFAC,,, | Performed by: OBSTETRICS & GYNECOLOGY

## 2022-03-07 PROCEDURE — 99214 OFFICE O/P EST MOD 30 MIN: CPT | Mod: S$PBB,TH,, | Performed by: OBSTETRICS & GYNECOLOGY

## 2022-03-07 PROCEDURE — 99214 PR OFFICE/OUTPT VISIT, EST, LEVL IV, 30-39 MIN: ICD-10-PCS | Mod: S$PBB,TH,, | Performed by: OBSTETRICS & GYNECOLOGY

## 2022-03-07 PROCEDURE — 99999 PR PBB SHADOW E&M-EST. PATIENT-LVL II: CPT | Mod: PBBFAC,,, | Performed by: OBSTETRICS & GYNECOLOGY

## 2022-03-10 ENCOUNTER — ROUTINE PRENATAL (OUTPATIENT)
Dept: OBSTETRICS AND GYNECOLOGY | Facility: CLINIC | Age: 27
End: 2022-03-10
Payer: MEDICAID

## 2022-03-10 VITALS — SYSTOLIC BLOOD PRESSURE: 100 MMHG | DIASTOLIC BLOOD PRESSURE: 58 MMHG | WEIGHT: 172.38 LBS

## 2022-03-10 DIAGNOSIS — Z34.83 ENCOUNTER FOR SUPERVISION OF OTHER NORMAL PREGNANCY IN THIRD TRIMESTER: ICD-10-CM

## 2022-03-10 DIAGNOSIS — Z3A.38 38 WEEKS GESTATION OF PREGNANCY: Primary | ICD-10-CM

## 2022-03-10 PROCEDURE — 99212 OFFICE O/P EST SF 10 MIN: CPT | Mod: PBBFAC,TH,PO | Performed by: OBSTETRICS & GYNECOLOGY

## 2022-03-10 PROCEDURE — 99999 PR PBB SHADOW E&M-EST. PATIENT-LVL II: ICD-10-PCS | Mod: PBBFAC,,, | Performed by: OBSTETRICS & GYNECOLOGY

## 2022-03-10 PROCEDURE — 99999 PR PBB SHADOW E&M-EST. PATIENT-LVL II: CPT | Mod: PBBFAC,,, | Performed by: OBSTETRICS & GYNECOLOGY

## 2022-03-10 PROCEDURE — 99213 PR OFFICE/OUTPT VISIT, EST, LEVL III, 20-29 MIN: ICD-10-PCS | Mod: S$PBB,TH,, | Performed by: OBSTETRICS & GYNECOLOGY

## 2022-03-10 PROCEDURE — 99213 OFFICE O/P EST LOW 20 MIN: CPT | Mod: S$PBB,TH,, | Performed by: OBSTETRICS & GYNECOLOGY

## 2022-03-10 NOTE — PROGRESS NOTES
No complaints today   No nausea or vomiting   Denies vaginal bleeding or cramping     BP (!) 100/58   Wt 78.2 kg (172 lb 6.4 oz)   LMP 07/15/2021 (Exact Date)     Prenatal labs reviewed  Aneuploidy screen  : offered  / declined ( - )    Anatomy US at 20 weeks   Consents=signed   Diabetes screen=negative   TdaP=given   GBBS @34-35W=  Neg  Growth US @ 36W  BC= adequate growth       General Pregnancy  recommendations given  PNV + H2O intake    FU in 1w   Discussed nonpharmacological pain relief methods for labor, techniques and benefits of effective breastfeeding position and latch, and basic breastfeeding management. Encouraged patient to attend [facility] and to download [facility]  mobile josesito if she has not already done so. Patient verbalizes understanding.     I spent a total of 30 minutes on the day of the visit.This includes face to face time and non-face to face time preparing to see the patient (eg, review of tests), Obtaining and/or reviewing separately obtained history, Documenting clinical information in the electronic or other health record, Independently interpreting resultsand communicating results to the patient/family/caregiver, or Care coordination.          Woody Bobo M.D.   OB/GYN

## 2022-03-14 NOTE — PROGRESS NOTES
No complaints today   No nausea or vomiting   Denies vaginal bleeding or cramping     /80   Wt 77.6 kg (171 lb 1.2 oz)   LMP 07/15/2021 (Exact Date)     Prenatal labs reviewed  Aneuploidy screen  : offered  / declined ( - )    Anatomy US at 20 weeks   Consents=signed   Diabetes screen=negative   TdaP=given   GBBS @34-35W=  Neg  Growth US @ 36W  BC=       General Pregnancy  recommendations given  PNV + H2O intake    FU in 1w   Discussed nonpharmacological pain relief methods for labor, techniques and benefits of effective breastfeeding position and latch, and basic breastfeeding management. Encouraged patient to attend [facility] and to download [facility]  mobile josesito if she has not already done so. Patient verbalizes understanding.     I spent a total of 30 minutes on the day of the visit.This includes face to face time and non-face to face time preparing to see the patient (eg, review of tests), Obtaining and/or reviewing separately obtained history, Documenting clinical information in the electronic or other health record, Independently interpreting resultsand communicating results to the patient/family/caregiver, or Care coordination.          Woody Bobo M.D.   OB/GYN

## 2022-03-17 ENCOUNTER — ROUTINE PRENATAL (OUTPATIENT)
Dept: OBSTETRICS AND GYNECOLOGY | Facility: CLINIC | Age: 27
End: 2022-03-17
Payer: MEDICAID

## 2022-03-17 VITALS — WEIGHT: 170.44 LBS | SYSTOLIC BLOOD PRESSURE: 104 MMHG | DIASTOLIC BLOOD PRESSURE: 62 MMHG

## 2022-03-17 DIAGNOSIS — Z3A.39 39 WEEKS GESTATION OF PREGNANCY: ICD-10-CM

## 2022-03-17 DIAGNOSIS — Z30.017 ENCOUNTER FOR INITIAL PRESCRIPTION OF IMPLANTABLE SUBDERMAL CONTRACEPTIVE: Primary | ICD-10-CM

## 2022-03-17 PROCEDURE — 99212 OFFICE O/P EST SF 10 MIN: CPT | Mod: PBBFAC,PO | Performed by: OBSTETRICS & GYNECOLOGY

## 2022-03-17 PROCEDURE — 99999 PR PBB SHADOW E&M-EST. PATIENT-LVL II: CPT | Mod: PBBFAC,,, | Performed by: OBSTETRICS & GYNECOLOGY

## 2022-03-17 PROCEDURE — 99999 PR PBB SHADOW E&M-EST. PATIENT-LVL II: ICD-10-PCS | Mod: PBBFAC,,, | Performed by: OBSTETRICS & GYNECOLOGY

## 2022-03-17 PROCEDURE — 99213 OFFICE O/P EST LOW 20 MIN: CPT | Mod: S$PBB,,, | Performed by: OBSTETRICS & GYNECOLOGY

## 2022-03-17 PROCEDURE — 99213 PR OFFICE/OUTPT VISIT, EST, LEVL III, 20-29 MIN: ICD-10-PCS | Mod: S$PBB,,, | Performed by: OBSTETRICS & GYNECOLOGY

## 2022-03-17 RX ORDER — SODIUM CHLORIDE 9 MG/ML
INJECTION, SOLUTION INTRAVENOUS
Status: CANCELLED | OUTPATIENT
Start: 2022-03-17

## 2022-03-17 RX ORDER — CALCIUM CARBONATE 200(500)MG
500 TABLET,CHEWABLE ORAL 3 TIMES DAILY PRN
Status: CANCELLED | OUTPATIENT
Start: 2022-03-17

## 2022-03-17 RX ORDER — OXYTOCIN/RINGER'S LACTATE 30/500 ML
334 PLASTIC BAG, INJECTION (ML) INTRAVENOUS ONCE
Status: CANCELLED | OUTPATIENT
Start: 2022-03-17 | End: 2022-03-17

## 2022-03-17 RX ORDER — PROCHLORPERAZINE EDISYLATE 5 MG/ML
5 INJECTION INTRAMUSCULAR; INTRAVENOUS EVERY 6 HOURS PRN
Status: CANCELLED | OUTPATIENT
Start: 2022-03-17

## 2022-03-17 RX ORDER — SODIUM CHLORIDE 9 MG/ML
INJECTION, SOLUTION INTRAVENOUS CONTINUOUS
Status: CANCELLED | OUTPATIENT
Start: 2022-03-17

## 2022-03-17 RX ORDER — OXYTOCIN/RINGER'S LACTATE 30/500 ML
0-30 PLASTIC BAG, INJECTION (ML) INTRAVENOUS CONTINUOUS
Status: CANCELLED | OUTPATIENT
Start: 2022-03-17

## 2022-03-17 RX ORDER — SIMETHICONE 80 MG
1 TABLET,CHEWABLE ORAL 4 TIMES DAILY PRN
Status: CANCELLED | OUTPATIENT
Start: 2022-03-17

## 2022-03-17 RX ORDER — ONDANSETRON 4 MG/1
8 TABLET, ORALLY DISINTEGRATING ORAL EVERY 8 HOURS PRN
Status: CANCELLED | OUTPATIENT
Start: 2022-03-17

## 2022-03-17 RX ORDER — TERBUTALINE SULFATE 1 MG/ML
0.25 INJECTION SUBCUTANEOUS
Status: CANCELLED | OUTPATIENT
Start: 2022-03-17

## 2022-03-17 RX ORDER — CARBOPROST TROMETHAMINE 250 UG/ML
250 INJECTION, SOLUTION INTRAMUSCULAR
Status: CANCELLED | OUTPATIENT
Start: 2022-03-17

## 2022-03-17 RX ORDER — MISOPROSTOL 100 MCG
50 TABLET ORAL EVERY 4 HOURS PRN
Status: CANCELLED | OUTPATIENT
Start: 2022-03-17

## 2022-03-17 RX ORDER — MUPIROCIN 20 MG/G
OINTMENT TOPICAL
Status: CANCELLED | OUTPATIENT
Start: 2022-03-17

## 2022-03-17 RX ORDER — METHYLERGONOVINE MALEATE 0.2 MG/ML
200 INJECTION INTRAVENOUS
Status: CANCELLED | OUTPATIENT
Start: 2022-03-17

## 2022-03-17 RX ORDER — OXYTOCIN/RINGER'S LACTATE 30/500 ML
95 PLASTIC BAG, INJECTION (ML) INTRAVENOUS ONCE
Status: CANCELLED | OUTPATIENT
Start: 2022-03-17 | End: 2022-03-17

## 2022-03-17 RX ORDER — MISOPROSTOL 100 UG/1
800 TABLET ORAL
Status: CANCELLED | OUTPATIENT
Start: 2022-03-17

## 2022-03-17 NOTE — PROGRESS NOTES
No complaints today   No nausea or vomiting   Denies vaginal bleeding or cramping     /62   Wt 77.3 kg (170 lb 6.7 oz)   LMP 07/15/2021 (Exact Date)     Prenatal labs reviewed  Aneuploidy screen  : offered  / declined ( - )    Anatomy US at 20 weeks   Consents=signed   Diabetes screen=negative   TdaP=given   GBBS @34-35W=  Neg  Growth US @ 36W  BC= adequate growth       General Pregnancy  recommendations given  PNV + H2O intake    FU in 1w   Discussed nonpharmacological pain relief methods for labor, techniques and benefits of effective breastfeeding position and latch, and basic breastfeeding management. Encouraged patient to attend [facility] P[facility] and to download [facility]  mobile josesito if she has not already done so. Patient verbalizes understanding.     I spent a total of 30 minutes on the day of the visit.This includes face to face time and non-face to face time preparing to see the patient (eg, review of tests), Obtaining and/or reviewing separately obtained history, Documenting clinical information in the electronic or other health record, Independently interpreting resultsand communicating results to the patient/family/caregiver, or Care coordination.          Woody Bobo M.D.   OB/GYN

## 2022-03-21 ENCOUNTER — ANESTHESIA EVENT (OUTPATIENT)
Dept: OBSTETRICS AND GYNECOLOGY | Facility: HOSPITAL | Age: 27
End: 2022-03-21
Payer: MEDICAID

## 2022-03-21 ENCOUNTER — ANESTHESIA (OUTPATIENT)
Dept: OBSTETRICS AND GYNECOLOGY | Facility: HOSPITAL | Age: 27
End: 2022-03-21
Payer: MEDICAID

## 2022-03-21 ENCOUNTER — HOSPITAL ENCOUNTER (INPATIENT)
Facility: HOSPITAL | Age: 27
LOS: 3 days | Discharge: HOME | End: 2022-03-24
Attending: OBSTETRICS & GYNECOLOGY | Admitting: OBSTETRICS & GYNECOLOGY
Payer: MEDICAID

## 2022-03-21 DIAGNOSIS — Z3A.40 40 WEEKS GESTATION OF PREGNANCY: ICD-10-CM

## 2022-03-21 DIAGNOSIS — Z30.017 ENCOUNTER FOR INITIAL PRESCRIPTION OF IMPLANTABLE SUBDERMAL CONTRACEPTIVE: ICD-10-CM

## 2022-03-21 DIAGNOSIS — Z3A.39 39 WEEKS GESTATION OF PREGNANCY: ICD-10-CM

## 2022-03-21 LAB
ABO + RH BLD: NORMAL
ALBUMIN SERPL BCP-MCNC: 3.1 G/DL (ref 3.5–5.2)
ALP SERPL-CCNC: 136 U/L (ref 55–135)
ALT SERPL W/O P-5'-P-CCNC: 18 U/L (ref 10–44)
ANION GAP SERPL CALC-SCNC: 7 MMOL/L (ref 8–16)
AST SERPL-CCNC: 20 U/L (ref 10–40)
BASOPHILS # BLD AUTO: 0.03 K/UL (ref 0–0.2)
BASOPHILS NFR BLD: 0.3 % (ref 0–1.9)
BILIRUB SERPL-MCNC: 0.3 MG/DL (ref 0.1–1)
BLD GP AB SCN CELLS X3 SERPL QL: NORMAL
BUN SERPL-MCNC: 7 MG/DL (ref 6–20)
CALCIUM SERPL-MCNC: 8.6 MG/DL (ref 8.7–10.5)
CHLORIDE SERPL-SCNC: 107 MMOL/L (ref 95–110)
CO2 SERPL-SCNC: 23 MMOL/L (ref 23–29)
CREAT SERPL-MCNC: 0.6 MG/DL (ref 0.5–1.4)
DIFFERENTIAL METHOD: ABNORMAL
EOSINOPHIL # BLD AUTO: 0 K/UL (ref 0–0.5)
EOSINOPHIL NFR BLD: 0.5 % (ref 0–8)
ERYTHROCYTE [DISTWIDTH] IN BLOOD BY AUTOMATED COUNT: 14.7 % (ref 11.5–14.5)
EST. GFR  (AFRICAN AMERICAN): >60 ML/MIN/1.73 M^2
EST. GFR  (NON AFRICAN AMERICAN): >60 ML/MIN/1.73 M^2
GLUCOSE SERPL-MCNC: 85 MG/DL (ref 70–110)
HCT VFR BLD AUTO: 34.5 % (ref 37–48.5)
HGB BLD-MCNC: 11.7 G/DL (ref 12–16)
HIV1+2 IGG SERPL QL IA.RAPID: NORMAL
IMM GRANULOCYTES # BLD AUTO: 0.03 K/UL (ref 0–0.04)
IMM GRANULOCYTES NFR BLD AUTO: 0.3 % (ref 0–0.5)
LYMPHOCYTES # BLD AUTO: 1.6 K/UL (ref 1–4.8)
LYMPHOCYTES NFR BLD: 18.3 % (ref 18–48)
MCH RBC QN AUTO: 30 PG (ref 27–31)
MCHC RBC AUTO-ENTMCNC: 33.9 G/DL (ref 32–36)
MCV RBC AUTO: 89 FL (ref 82–98)
MONOCYTES # BLD AUTO: 0.8 K/UL (ref 0.3–1)
MONOCYTES NFR BLD: 8.8 % (ref 4–15)
NEUTROPHILS # BLD AUTO: 6.3 K/UL (ref 1.8–7.7)
NEUTROPHILS NFR BLD: 71.8 % (ref 38–73)
NRBC BLD-RTO: 0 /100 WBC
PLATELET # BLD AUTO: 290 K/UL (ref 150–450)
PMV BLD AUTO: 11.1 FL (ref 9.2–12.9)
POTASSIUM SERPL-SCNC: 3.7 MMOL/L (ref 3.5–5.1)
PROT SERPL-MCNC: 7 G/DL (ref 6–8.4)
RBC # BLD AUTO: 3.9 M/UL (ref 4–5.4)
SARS-COV-2 RDRP RESP QL NAA+PROBE: NEGATIVE
SODIUM SERPL-SCNC: 137 MMOL/L (ref 136–145)
WBC # BLD AUTO: 8.83 K/UL (ref 3.9–12.7)

## 2022-03-21 PROCEDURE — 86850 RBC ANTIBODY SCREEN: CPT | Performed by: OBSTETRICS & GYNECOLOGY

## 2022-03-21 PROCEDURE — 11000001 HC ACUTE MED/SURG PRIVATE ROOM

## 2022-03-21 PROCEDURE — 86703 HIV-1/HIV-2 1 RESULT ANTBDY: CPT | Performed by: OBSTETRICS & GYNECOLOGY

## 2022-03-21 PROCEDURE — 80053 COMPREHEN METABOLIC PANEL: CPT | Performed by: OBSTETRICS & GYNECOLOGY

## 2022-03-21 PROCEDURE — 85025 COMPLETE CBC W/AUTO DIFF WBC: CPT | Performed by: OBSTETRICS & GYNECOLOGY

## 2022-03-21 PROCEDURE — U0002 COVID-19 LAB TEST NON-CDC: HCPCS | Performed by: OBSTETRICS & GYNECOLOGY

## 2022-03-21 PROCEDURE — 86900 BLOOD TYPING SEROLOGIC ABO: CPT | Performed by: OBSTETRICS & GYNECOLOGY

## 2022-03-21 PROCEDURE — 25000003 PHARM REV CODE 250: Performed by: OBSTETRICS & GYNECOLOGY

## 2022-03-21 RX ORDER — MISOPROSTOL 200 UG/1
800 TABLET ORAL
Status: DISCONTINUED | OUTPATIENT
Start: 2022-03-21 | End: 2022-03-24 | Stop reason: HOSPADM

## 2022-03-21 RX ORDER — TRANEXAMIC ACID 100 MG/ML
1000 INJECTION, SOLUTION INTRAVENOUS ONCE AS NEEDED
Status: DISCONTINUED | OUTPATIENT
Start: 2022-03-21 | End: 2022-03-24 | Stop reason: HOSPADM

## 2022-03-21 RX ORDER — CALCIUM CARBONATE 200(500)MG
500 TABLET,CHEWABLE ORAL 3 TIMES DAILY PRN
Status: DISCONTINUED | OUTPATIENT
Start: 2022-03-21 | End: 2022-03-24 | Stop reason: HOSPADM

## 2022-03-21 RX ORDER — OXYTOCIN/RINGER'S LACTATE 30/500 ML
95 PLASTIC BAG, INJECTION (ML) INTRAVENOUS ONCE
Status: DISCONTINUED | OUTPATIENT
Start: 2022-03-21 | End: 2022-03-23

## 2022-03-21 RX ORDER — ONDANSETRON 8 MG/1
8 TABLET, ORALLY DISINTEGRATING ORAL EVERY 8 HOURS PRN
Status: DISCONTINUED | OUTPATIENT
Start: 2022-03-21 | End: 2022-03-23 | Stop reason: DRUGHIGH

## 2022-03-21 RX ORDER — CARBOPROST TROMETHAMINE 250 UG/ML
250 INJECTION, SOLUTION INTRAMUSCULAR
Status: DISCONTINUED | OUTPATIENT
Start: 2022-03-21 | End: 2022-03-24 | Stop reason: HOSPADM

## 2022-03-21 RX ORDER — OXYTOCIN/RINGER'S LACTATE 30/500 ML
334 PLASTIC BAG, INJECTION (ML) INTRAVENOUS ONCE
Status: DISCONTINUED | OUTPATIENT
Start: 2022-03-21 | End: 2022-03-23

## 2022-03-21 RX ORDER — METHYLERGONOVINE MALEATE 0.2 MG/ML
200 INJECTION INTRAVENOUS
Status: DISCONTINUED | OUTPATIENT
Start: 2022-03-21 | End: 2022-03-24 | Stop reason: HOSPADM

## 2022-03-21 RX ORDER — PROCHLORPERAZINE EDISYLATE 5 MG/ML
5 INJECTION INTRAMUSCULAR; INTRAVENOUS EVERY 6 HOURS PRN
Status: DISCONTINUED | OUTPATIENT
Start: 2022-03-21 | End: 2022-03-23 | Stop reason: SDUPTHER

## 2022-03-21 RX ORDER — MUPIROCIN 20 MG/G
OINTMENT TOPICAL
Status: DISCONTINUED | OUTPATIENT
Start: 2022-03-21 | End: 2022-03-23

## 2022-03-21 RX ORDER — OXYTOCIN/RINGER'S LACTATE 30/500 ML
0-30 PLASTIC BAG, INJECTION (ML) INTRAVENOUS CONTINUOUS
Status: DISCONTINUED | OUTPATIENT
Start: 2022-03-21 | End: 2022-03-23

## 2022-03-21 RX ORDER — SODIUM CHLORIDE 9 MG/ML
INJECTION, SOLUTION INTRAVENOUS
Status: DISCONTINUED | OUTPATIENT
Start: 2022-03-21 | End: 2022-03-23

## 2022-03-21 RX ORDER — SODIUM CHLORIDE 9 MG/ML
INJECTION, SOLUTION INTRAVENOUS CONTINUOUS
Status: DISCONTINUED | OUTPATIENT
Start: 2022-03-21 | End: 2022-03-23

## 2022-03-21 RX ORDER — TERBUTALINE SULFATE 1 MG/ML
0.25 INJECTION SUBCUTANEOUS
Status: DISCONTINUED | OUTPATIENT
Start: 2022-03-21 | End: 2022-03-24 | Stop reason: HOSPADM

## 2022-03-21 RX ORDER — SIMETHICONE 80 MG
1 TABLET,CHEWABLE ORAL 4 TIMES DAILY PRN
Status: DISCONTINUED | OUTPATIENT
Start: 2022-03-21 | End: 2022-03-23

## 2022-03-21 RX ORDER — MISOPROSTOL 100 MCG
50 TABLET ORAL EVERY 4 HOURS PRN
Status: DISCONTINUED | OUTPATIENT
Start: 2022-03-21 | End: 2022-03-24 | Stop reason: HOSPADM

## 2022-03-21 RX ADMIN — Medication 50 MCG: at 08:03

## 2022-03-22 PROBLEM — Z3A.40 40 WEEKS GESTATION OF PREGNANCY: Status: ACTIVE | Noted: 2022-03-22

## 2022-03-22 PROCEDURE — 59409 OBSTETRICAL CARE: CPT | Mod: GB,,, | Performed by: OBSTETRICS & GYNECOLOGY

## 2022-03-22 PROCEDURE — 59409 PR OBSTETRICAL CARE,VAG DELIV ONLY: ICD-10-PCS | Mod: GB,,, | Performed by: OBSTETRICS & GYNECOLOGY

## 2022-03-22 PROCEDURE — 11000001 HC ACUTE MED/SURG PRIVATE ROOM

## 2022-03-22 PROCEDURE — 25000003 PHARM REV CODE 250: Performed by: STUDENT IN AN ORGANIZED HEALTH CARE EDUCATION/TRAINING PROGRAM

## 2022-03-22 PROCEDURE — 25000003 PHARM REV CODE 250: Performed by: OBSTETRICS & GYNECOLOGY

## 2022-03-22 PROCEDURE — 51702 INSERT TEMP BLADDER CATH: CPT

## 2022-03-22 PROCEDURE — 72200005 HC VAGINAL DELIVERY LEVEL II

## 2022-03-22 PROCEDURE — 63600175 PHARM REV CODE 636 W HCPCS: Performed by: OBSTETRICS & GYNECOLOGY

## 2022-03-22 PROCEDURE — 62326 NJX INTERLAMINAR LMBR/SAC: CPT | Performed by: STUDENT IN AN ORGANIZED HEALTH CARE EDUCATION/TRAINING PROGRAM

## 2022-03-22 PROCEDURE — 72100002 HC LABOR CARE, 1ST 8 HOURS

## 2022-03-22 PROCEDURE — 27800516 HC TRAY, EPIDURAL COMBO: Performed by: ANESTHESIOLOGY

## 2022-03-22 PROCEDURE — 63600175 PHARM REV CODE 636 W HCPCS: Performed by: STUDENT IN AN ORGANIZED HEALTH CARE EDUCATION/TRAINING PROGRAM

## 2022-03-22 PROCEDURE — 63600175 PHARM REV CODE 636 W HCPCS: Performed by: ANESTHESIOLOGY

## 2022-03-22 PROCEDURE — 27000181 HC CABLE, IUPC

## 2022-03-22 PROCEDURE — 27200710 HC EPIDURAL INFUSION PUMP SET: Performed by: ANESTHESIOLOGY

## 2022-03-22 RX ORDER — FENTANYL CITRATE 50 UG/ML
INJECTION, SOLUTION INTRAMUSCULAR; INTRAVENOUS
Status: DISCONTINUED | OUTPATIENT
Start: 2022-03-22 | End: 2022-03-22

## 2022-03-22 RX ORDER — DIPHENHYDRAMINE HCL 25 MG
25 CAPSULE ORAL EVERY 4 HOURS PRN
Status: DISCONTINUED | OUTPATIENT
Start: 2022-03-23 | End: 2022-03-24 | Stop reason: HOSPADM

## 2022-03-22 RX ORDER — DOCUSATE SODIUM 100 MG/1
200 CAPSULE, LIQUID FILLED ORAL 2 TIMES DAILY PRN
Status: DISCONTINUED | OUTPATIENT
Start: 2022-03-23 | End: 2022-03-24 | Stop reason: HOSPADM

## 2022-03-22 RX ORDER — FENTANYL/ROPIVACAINE/NS/PF 2MCG/ML-.2
PLASTIC BAG, INJECTION (ML) INJECTION CONTINUOUS
Status: DISCONTINUED | OUTPATIENT
Start: 2022-03-22 | End: 2022-03-24 | Stop reason: HOSPADM

## 2022-03-22 RX ORDER — SIMETHICONE 80 MG
1 TABLET,CHEWABLE ORAL EVERY 6 HOURS PRN
Status: DISCONTINUED | OUTPATIENT
Start: 2022-03-23 | End: 2022-03-24 | Stop reason: HOSPADM

## 2022-03-22 RX ORDER — OXYTOCIN/RINGER'S LACTATE 30/500 ML
95 PLASTIC BAG, INJECTION (ML) INTRAVENOUS ONCE
Status: DISCONTINUED | OUTPATIENT
Start: 2022-03-23 | End: 2022-03-23

## 2022-03-22 RX ORDER — HYDROCORTISONE 25 MG/G
CREAM TOPICAL 3 TIMES DAILY PRN
Status: DISCONTINUED | OUTPATIENT
Start: 2022-03-23 | End: 2022-03-24 | Stop reason: HOSPADM

## 2022-03-22 RX ORDER — OXYCODONE AND ACETAMINOPHEN 5; 325 MG/1; MG/1
1 TABLET ORAL EVERY 4 HOURS PRN
Status: DISCONTINUED | OUTPATIENT
Start: 2022-03-23 | End: 2022-03-24 | Stop reason: HOSPADM

## 2022-03-22 RX ORDER — FENTANYL/ROPIVACAINE/NS/PF 2MCG/ML-.2
PLASTIC BAG, INJECTION (ML) INJECTION CONTINUOUS PRN
Status: DISCONTINUED | OUTPATIENT
Start: 2022-03-22 | End: 2022-03-22

## 2022-03-22 RX ORDER — SODIUM CHLORIDE 9 MG/ML
INJECTION, SOLUTION INTRAVENOUS CONTINUOUS
Status: DISCONTINUED | OUTPATIENT
Start: 2022-03-23 | End: 2022-03-23

## 2022-03-22 RX ORDER — ACETAMINOPHEN 325 MG/1
650 TABLET ORAL EVERY 6 HOURS PRN
Status: DISCONTINUED | OUTPATIENT
Start: 2022-03-23 | End: 2022-03-24 | Stop reason: HOSPADM

## 2022-03-22 RX ORDER — DIPHENHYDRAMINE HYDROCHLORIDE 50 MG/ML
25 INJECTION INTRAMUSCULAR; INTRAVENOUS EVERY 4 HOURS PRN
Status: DISCONTINUED | OUTPATIENT
Start: 2022-03-23 | End: 2022-03-24 | Stop reason: HOSPADM

## 2022-03-22 RX ORDER — FAMOTIDINE 10 MG/ML
20 INJECTION INTRAVENOUS ONCE
Status: DISCONTINUED | OUTPATIENT
Start: 2022-03-22 | End: 2022-03-23

## 2022-03-22 RX ORDER — NAPROXEN 500 MG/1
500 TABLET ORAL EVERY 8 HOURS PRN
Status: DISCONTINUED | OUTPATIENT
Start: 2022-03-23 | End: 2022-03-24 | Stop reason: HOSPADM

## 2022-03-22 RX ORDER — PHENYLEPHRINE HYDROCHLORIDE 10 MG/ML
INJECTION INTRAVENOUS
Status: DISCONTINUED | OUTPATIENT
Start: 2022-03-22 | End: 2022-03-22

## 2022-03-22 RX ORDER — SODIUM CITRATE AND CITRIC ACID MONOHYDRATE 334; 500 MG/5ML; MG/5ML
30 SOLUTION ORAL ONCE
Status: DISCONTINUED | OUTPATIENT
Start: 2022-03-22 | End: 2022-03-23

## 2022-03-22 RX ORDER — PRENATAL WITH FERROUS FUM AND FOLIC ACID 3080; 920; 120; 400; 22; 1.84; 3; 20; 10; 1; 12; 200; 27; 25; 2 [IU]/1; [IU]/1; MG/1; [IU]/1; MG/1; MG/1; MG/1; MG/1; MG/1; MG/1; UG/1; MG/1; MG/1; MG/1; MG/1
1 TABLET ORAL DAILY
Status: DISCONTINUED | OUTPATIENT
Start: 2022-03-23 | End: 2022-03-24 | Stop reason: HOSPADM

## 2022-03-22 RX ORDER — LIDOCAINE HYDROCHLORIDE AND EPINEPHRINE 15; 5 MG/ML; UG/ML
INJECTION, SOLUTION EPIDURAL
Status: DISCONTINUED | OUTPATIENT
Start: 2022-03-22 | End: 2022-03-22

## 2022-03-22 RX ORDER — FENTANYL/ROPIVACAINE/NS/PF 2MCG/ML-.2
PLASTIC BAG, INJECTION (ML) INJECTION
Status: COMPLETED
Start: 2022-03-22 | End: 2022-03-22

## 2022-03-22 RX ADMIN — LIDOCAINE HYDROCHLORIDE,EPINEPHRINE BITARTRATE 3 ML: 15; .005 INJECTION, SOLUTION EPIDURAL; INFILTRATION; INTRACAUDAL; PERINEURAL at 05:03

## 2022-03-22 RX ADMIN — PHENYLEPHRINE HYDROCHLORIDE 200 MCG: 10 INJECTION INTRAVENOUS at 10:03

## 2022-03-22 RX ADMIN — ACETAMINOPHEN 650 MG: 325 TABLET ORAL at 11:03

## 2022-03-22 RX ADMIN — FENTANYL CIT 0.2 MG/100ML-ROPIV 0.2%-NACL 0.9% EPIDURAL INJ 10 ML/HR: 2/0.2 SOLUTION at 05:03

## 2022-03-22 RX ADMIN — Medication 50 MCG: at 12:03

## 2022-03-22 RX ADMIN — FENTANYL CITRATE 100 MCG: 50 INJECTION, SOLUTION INTRAMUSCULAR; INTRAVENOUS at 04:03

## 2022-03-22 RX ADMIN — Medication 2 MILLI-UNITS/MIN: at 05:03

## 2022-03-22 RX ADMIN — FENTANYL CIT 0.2 MG/100ML-ROPIV 0.2%-NACL 0.9% EPIDURAL INJ 8 ML/HR: 2/0.2 SOLUTION at 03:03

## 2022-03-22 RX ADMIN — PHENYLEPHRINE HYDROCHLORIDE 200 MCG: 10 INJECTION INTRAVENOUS at 09:03

## 2022-03-22 NOTE — ANESTHESIA PREPROCEDURE EVALUATION
2022  Gisel Hernandez is a 26 y.o. female  in active labor.     OB History    Para Term  AB Living   1             SAB IAB Ectopic Multiple Live Births                  # Outcome Date GA Lbr Emile/2nd Weight Sex Delivery Anes PTL Lv   1 Current                Wt Readings from Last 1 Encounters:   22 1034 77.3 kg (170 lb 6.7 oz)       BP Readings from Last 3 Encounters:   22 (!) 99/58   22 104/62   03/10/22 (!) 100/58       There is no problem list on file for this patient.      No past surgical history on file.    Social History     Socioeconomic History    Marital status: Single   Tobacco Use    Smoking status: Never Smoker    Smokeless tobacco: Never Used   Substance and Sexual Activity    Alcohol use: Not Currently    Drug use: Never    Sexual activity: Yes     Partners: Male         Chemistry        Component Value Date/Time     2022    K 3.7 2022     2022    CO2 23 2022    BUN 7 2022    CREATININE 0.6 2022    GLU 85 2022        Component Value Date/Time    CALCIUM 8.6 (L) 2022    ALKPHOS 136 (H) 2022    AST 20 2022    ALT 18 2022    BILITOT 0.3 2022    ESTGFRAFRICA >60 2022    EGFRNONAA >60 2022            Lab Results   Component Value Date    WBC 8.83 2022    HGB 11.7 (L) 2022    HCT 34.5 (L) 2022    MCV 89 2022     2022       No results for input(s): PT, INR, PROTIME, APTT in the last 72 hours.        Pre-op Assessment    I have reviewed the Patient Summary Reports.     I have reviewed the Nursing Notes.    I have reviewed the Medications.     Review of Systems  Anesthesia Hx:  No previous Anesthesia    Social:  Non-Smoker, No Alcohol Use     Hematology/Oncology:     Oncology Normal    -- Anemia:   EENT/Dental:EENT/Dental Normal   Cardiovascular:  Cardiovascular Normal     Pulmonary:  Pulmonary Normal    Musculoskeletal:  Musculoskeletal Normal    Neurological:  Neurology Normal    Endocrine:  Endocrine Normal  Obesity / BMI > 30  Dermatological:  Skin Normal    Psych:  Psychiatric Normal           Physical Exam  General: Well nourished    Airway:  Mallampati: III / II  Mouth Opening: Normal    Dental:  Intact        Anesthesia Plan  Type of Anesthesia, risks & benefits discussed:    Anesthesia Type: Epidural, Spinal, CSE, Gen ETT  Intra-op Monitoring Plan: Standard ASA Monitors  Post Op Pain Control Plan: multimodal analgesia, intrathecal opioid and epidural analgesia  Informed Consent: Informed consent signed with the Patient and all parties understand the risks and agree with anesthesia plan.  All questions answered.   ASA Score: 2  Day of Surgery Review of History & Physical: H&P Update referred to the surgeon/provider.    Ready For Surgery From Anesthesia Perspective.     .

## 2022-03-22 NOTE — H&P
UPDATED HISTORY AND PHYSICAL        3/22/2022  Subjective:       Gisel Hernandez is a 26 y.o.  female with IUP at 40w0d weeks gestation who presents for labor induction     This IUP is complicated by nothing .    Patient denies contractions, denies vaginal bleeding, denies LOF.   Fetal Movement: normal.     PMHx: History reviewed. No pertinent past medical history.    PSHx: History reviewed. No pertinent surgical history.    All: Review of patient's allergies indicates:  No Known Allergies    Meds:   Medications Prior to Admission   Medication Sig Dispense Refill Last Dose    promethazine (PHENERGAN) 25 MG tablet Take 1 tablet (25 mg total) by mouth every 6 (six) hours as needed for Nausea. 25 tablet 0     pyridoxine, vitamin B6, (B-6) 100 MG Tab Take 1 tablet (100 mg total) by mouth 3 (three) times daily before meals. 60 tablet 3        SH:   Social History     Socioeconomic History    Marital status: Single   Tobacco Use    Smoking status: Never Smoker    Smokeless tobacco: Never Used   Substance and Sexual Activity    Alcohol use: Not Currently    Drug use: Never    Sexual activity: Yes     Partners: Male       FH: No family history on file.    OBHx:   OB History    Para Term  AB Living   1 0 0 0 0 0   SAB IAB Ectopic Multiple Live Births   0 0 0 0 0      # Outcome Date GA Lbr Emile/2nd Weight Sex Delivery Anes PTL Lv   1 Current                Review of Systems: Non contributory      Objective:       BP 93/61   Pulse 69   Temp 98.5 °F (36.9 °C) (Oral)   LMP 07/15/2021 (Exact Date)   SpO2 95%   Breastfeeding No     Vitals:    22 0108 22 0109 22 0112 22 0300   BP:   93/61    Pulse: 65 66 69    Temp:    98.5 °F (36.9 °C)   TempSrc:    Oral   SpO2:  95%         General:   alert, appears stated age and cooperative   Lungs:   clear to auscultation bilaterally   Heart:   regular rate and rhythm, S1, S2 normal, no murmur, click, rub or gallop   Abdomen:   soft, non-tender; bowel sounds normal; no masses,  no organomegaly   Extremities negative edema, negative erythema   FHT: 155 Cat 1 (reassuring)                 TOCO: Q 0 minutes   Presentations: cephalic by ultrasound   Cervix:     Dilation: Closed    Effacement: Long    Station:  -3                   Lab Review  Blood Type A POS  GBBS: negative         Assessment:       40w0d weeks gestation.  Labor induction     There is no problem list on file for this patient.         Plan:      Risks, benefits, alternatives and possible complications have been discussed in detail with the patient.   - Consents signed and to chart  - Admit to Labor and Delivery unit  - (  x) Misoprostol /( X ) oxytocin per induction protocol   - Epidural per Anesthesia PRN  - Draw CBC, T&S  - Recheck in 2 hrs or PRN            Woody Bobo M.D.   OB/GYN    3/22/2022

## 2022-03-22 NOTE — PROGRESS NOTES
DATE: 3/22/2022    S: 26 y.o.  at 40w0d   O:   BP: 93/61    FHT: 145-155 x; Mod variability , Cat 1   [place]/IUPC: q 2-3 minutes   SVE:5   50 -3     AROM clear fluid     IUPC placed     ASSESSMENT: 40w0d There is no problem list on file for this patient.      PLAN:    Continue Close Maternal/Fetal Monitoring  Pitocin Augmentation per protocol  Recheck 2 hours or PRN    Woody Bobo M.D.   OB/GYN           Health Maintenance Summary     Topic Due On Due Status Completed On Postpone Until Reason    MAMMOGRAM - BREAST CANCER SCREENING Sep 6, 2019 Not Due Sep 6, 2017      Colorectal Cancer Screening - Colonoscopy Jan 6, 2024 Not Due Jan 6, 2014      Pap Smear - Cervical Cancer Screening  Dec 23, 2019 Not Due Dec 23, 2014      Immunization-Zoster Apr 8, 2016 Postponed  Jan 18, 2018 Insurance or Financial    Immunization - TDAP Pregnancy  Hidden       IMMUNIZATION - DTaP/Tdap/Td Jun 23, 2026 Not Due Jun 23, 2016      Immunization-Influenza Sep 1, 2017 Due On Oct 25, 2016            Patient is due for topics as listed above, she wishes to discuss with provider .

## 2022-03-22 NOTE — ANESTHESIA PROCEDURE NOTES
CSE    Patient location during procedure: OB  Start time: 3/22/2022 5:11 AM  Timeout: 3/22/2022 5:11 AM  End time: 3/22/2022 5:14 AM    Reason for block: labor analgesia requested by patient and obstetrician    Staffing  Authorizing Provider: Debra Dean MD  Performing Provider: Josue Benavidez MD    Preanesthetic Checklist  Completed: patient identified, IV checked, site marked, risks and benefits discussed, surgical consent, monitors and equipment checked, pre-op evaluation and timeout performed  CSE  Patient position: sitting  Prep: ChloraPrep  Patient monitoring: heart rate, continuous pulse ox and frequent blood pressure checks  Approach: midline  Spinal Needle  Needle type: pencil-tip   Needle gauge: 25 G  Needle length: 5 in  Epidural Needle  Injection technique: BISMARK air  Needle type: Tuohy   Needle gauge: 17 G  Needle length: 3.5 in  Needle insertion depth: 4.5 cm  Location: L3-4  Needle localization: anatomical landmarks   Catheter  Catheter type: springwound  Catheter size: 19 G  Catheter at skin depth: 9.5 cm  Test dose: lidocaine 1.5% with Epi 1-to-200,000  Test dose: 3 mL  Additional Documentation: incremental injection, negative aspiration for CSF, negative aspiration for heme and no paresthesia on injection

## 2022-03-23 LAB
BASOPHILS # BLD AUTO: 0.03 K/UL (ref 0–0.2)
BASOPHILS NFR BLD: 0.2 % (ref 0–1.9)
DIFFERENTIAL METHOD: ABNORMAL
EOSINOPHIL # BLD AUTO: 0.1 K/UL (ref 0–0.5)
EOSINOPHIL NFR BLD: 0.3 % (ref 0–8)
ERYTHROCYTE [DISTWIDTH] IN BLOOD BY AUTOMATED COUNT: 14.8 % (ref 11.5–14.5)
HCT VFR BLD AUTO: 25.7 % (ref 37–48.5)
HGB BLD-MCNC: 8.9 G/DL (ref 12–16)
IMM GRANULOCYTES # BLD AUTO: 0.09 K/UL (ref 0–0.04)
IMM GRANULOCYTES NFR BLD AUTO: 0.5 % (ref 0–0.5)
LYMPHOCYTES # BLD AUTO: 1.3 K/UL (ref 1–4.8)
LYMPHOCYTES NFR BLD: 7.3 % (ref 18–48)
MCH RBC QN AUTO: 30.5 PG (ref 27–31)
MCHC RBC AUTO-ENTMCNC: 34.6 G/DL (ref 32–36)
MCV RBC AUTO: 88 FL (ref 82–98)
MONOCYTES # BLD AUTO: 1.9 K/UL (ref 0.3–1)
MONOCYTES NFR BLD: 10.4 % (ref 4–15)
NEUTROPHILS # BLD AUTO: 14.6 K/UL (ref 1.8–7.7)
NEUTROPHILS NFR BLD: 81.3 % (ref 38–73)
NRBC BLD-RTO: 0 /100 WBC
PLATELET # BLD AUTO: 217 K/UL (ref 150–450)
PMV BLD AUTO: 11.6 FL (ref 9.2–12.9)
RBC # BLD AUTO: 2.92 M/UL (ref 4–5.4)
WBC # BLD AUTO: 17.89 K/UL (ref 3.9–12.7)

## 2022-03-23 PROCEDURE — 25000003 PHARM REV CODE 250: Performed by: OBSTETRICS & GYNECOLOGY

## 2022-03-23 PROCEDURE — 36415 COLL VENOUS BLD VENIPUNCTURE: CPT | Performed by: OBSTETRICS & GYNECOLOGY

## 2022-03-23 PROCEDURE — 99232 PR SUBSEQUENT HOSPITAL CARE,LEVL II: ICD-10-PCS | Mod: ,,, | Performed by: OBSTETRICS & GYNECOLOGY

## 2022-03-23 PROCEDURE — 85025 COMPLETE CBC W/AUTO DIFF WBC: CPT | Performed by: OBSTETRICS & GYNECOLOGY

## 2022-03-23 PROCEDURE — 99232 SBSQ HOSP IP/OBS MODERATE 35: CPT | Mod: ,,, | Performed by: OBSTETRICS & GYNECOLOGY

## 2022-03-23 PROCEDURE — 11000001 HC ACUTE MED/SURG PRIVATE ROOM

## 2022-03-23 RX ORDER — NAPROXEN 500 MG/1
500 TABLET ORAL EVERY 8 HOURS PRN
Qty: 25 TABLET | Refills: 0 | Status: SHIPPED | OUTPATIENT
Start: 2022-03-23

## 2022-03-23 RX ORDER — PROCHLORPERAZINE EDISYLATE 5 MG/ML
5 INJECTION INTRAMUSCULAR; INTRAVENOUS EVERY 6 HOURS PRN
Status: DISCONTINUED | OUTPATIENT
Start: 2022-03-23 | End: 2022-03-24 | Stop reason: HOSPADM

## 2022-03-23 RX ORDER — ONDANSETRON 8 MG/1
8 TABLET, ORALLY DISINTEGRATING ORAL EVERY 8 HOURS PRN
Status: DISCONTINUED | OUTPATIENT
Start: 2022-03-23 | End: 2022-03-24 | Stop reason: HOSPADM

## 2022-03-23 RX ADMIN — PRENATAL VIT W/ FE FUMARATE-FA TAB 27-0.8 MG 1 TABLET: 27-0.8 TAB at 08:03

## 2022-03-23 RX ADMIN — NAPROXEN 500 MG: 500 TABLET ORAL at 08:03

## 2022-03-23 NOTE — DISCHARGE INSTRUCTIONS
"Instrucciones Para Rody de Margo    Instrucciones a Seguir    Dieta regular  Actividad: Aumentarntar gradualmente  Kuldip: Regadera o Smiley  Restricciones: No levantar nada pesado  Cuidado Personal: No tampones o duchas vaginales  Actividad Sexual: Luly relaciones sexuales  Planificacion Familiar: Consulta con fu medico  Cuidado de los Senos: Use un sosten de soporte  Regresar al trabajo/escuela: Cuando fu medico le indique    Cuando debe llamar al Doctor    *Fiebre de 100.4 o mas alto  *Nausea/Vomito persistente  *Secrecion de la incision  *Petra sangrado vaginal o coagulos  *Inflamacion/dolor en los brazos o las piernas  *Severo dolor de noman, vision borrosa or desmayos  *Frequencia/ardor urinario  *Senales de depresion post-parto        La Depresion Post-Parto    Usted acaba de tener un dash'.  A pesar de que sabe que deberia sentirse emocionada y damico, lo que seinte son ganas de llorar sin motivo y tiene dificultades para realizar sagrario tareas diarias.  Usted pasa la mayor parte del tiempo mandy, cansada y desesperanzada, y hasta podria sentirse avergonzada o culpable.  Jennifer lo que le esta sucediendo no es culpa suya, y puede sentirse mejor.  Hable con fu medico para que la ayude.     La Depresion Despues del Parto    Joel vez sienta cansancio y ganas de llorar kisha despues de rody a dhaval.  Esta etapa melancolica, denominada "baby blues", puede hacerla sentir mandy y desesperanzada, o temerosa de que algo ang le vaya a pasar al dash.  Algunas mujeres hasta llegan a poner en luis el que puedan ser buenas madres.    Que' es la Depresion?    La depresion es un trastorno del animo que afecta fu manera de pernsar y de sentir.  El sintoma mas frecuente es un sentimiento de honda tristeza: tambien podria causane la sensacion de que ya no puede sobrellevar la juan manuel.    Otros sintomas incluyen:      * Ganar o perder mucho peso  * Dormir en exceso o demasiado poco  * Estar cansada todo el tiempo  * Sentirse inquieta  * " Tener miedo de querer herir al dash'  * No tener interes por el dash'  * Sentirse inutil o culpable   * No encontrar placer en las cosas que solia gustarle hacer  * Dificultades para pensar con claridad o bianca decisiones  * Pensar sobre la muerte o el suicidio     Que' Causa la Depresion Postparto?    La causea exacta de la depresion postarto se desconce, aunque posiblemente es el resultado de los cambios hormonales que suceden linda y despues del parto.  Tambien puede deberse al cansancio que le causan las exigencias del dash' y el proceso de adaptacion a fu maternidad.  Todos estos factores podrian deprimirla.  En algunos casos, existe juan david predisposicion genetica a nicole tipo de depresion.    La depresion puede tratarse    Lo melo es que hay muchas maneras de tratar la depresion postparo.  Emprenda el primer paso para sentirse mejor hablando con fu medico.     Recursos    * National Institutes of Mental Health-- 906-130-0135     www.nimh.nih.gov    * National [place] on Mental Illness -- 505-972-5593     Www.janis.org    * Mental Health [place] --  [phone]     Www.[email or web address].org    * National Suidide Hotline -- 214.438.2472    0537-7282 [employer]  Todos los derechos reservados.  Esta informacion no pretende sustituir las atencion medica profesional.  Solo fu medico puede diagnosticar y tratar un problema de neto.       Instrucciones de la lactancia maternal para los bebes recién nacidos:   La [employer] recomienda la leche maternal moris la unica Tisha de nutrición para fu bebé linda los primeros 6 meses de la juan manuel, y puede continuar, con la introducción de comida, hasta y despues de 1 ano de edad. Informado sobre atwood consejos: Hay muchos beneficios de amamantar para el neto de la madre y del bebé. Luly los chupones, teteras, o botellas por lo menos por las primeras 4 semanas. Usar los chupones, teteras, o botellas pueden interumpir el proceso de amamantar.   Alimenta  en cuanto hay muestras de hambre:  Cedric en la boca, hacienda movimientos de succionar.  Ruiditos suavecitos o estirarse  Llorar es un signo de hambre tardío: no esperes a que el bebé llore.  Es de esperarse que haya 8-12 alimentaciones por 24 horas, aunque estas alimentaciones no sigan un horario definido.  Alterna el seno con el que empiezas, o empieza con el seno que se siente más lleno.  Cambia de lado cuando el bebé empiece a tragar más despacio o se desconecte del seno.  Esta danial si el bebé no come del chace seno en cada alimentación.  Si el bebé esta muy dormido o somnoliente: el contacto de piel a piel puede animarlo a empezar a comer:  Quítale la camiseta y acuéstalo sobre tu pecho desnudo  Duerme cerca de tu bebé, aun en la casa. Aprende a rody pecho acostada.  En la posición correcta los dos estarán cómodos:  barbilla con seno, pecho con pecho  Labio del bebé abierto hacia afuera para tragar: el labio del bebé debe estar volteado hacia afuera  Boca abierta danial eliot: la boca del bebé cubre la mayoría de la areola (el área oscura del seno)--no nada más el pezón  Fíjate en los signos de que hay transferencia de leche:  Puedes oír al bebé tragar.  No se oyen ruidos más o menos dago moris clic.  El bebé no demuestra que tiene más hambre después de la alimentación.  El cuerpo del bebé y sagrario cedric están relajados por un tiempo corto.  Lo que entra, tiene que salir. Está pendiente de:  Al cuarto día, por lo menos 3 cacas al día.  La jennifer cambia de leena a ariadna o café y luego a amarillo más líquido cuando baja tu leche.  Después del cuarto día, por lo menos 6 pa?ales mojados/pesados al día.  La orina debe ser de color amarillo faustina cuando baja tu leche.  Debes bianca agua cuando tienes sed y comer alimentos sanos cuando tiene hambre. Sherri siesta para descansar lo suficiente.   No tomes medicamentos o alcohol sino con el consejo de fu doctor. Puedes llamar al [facility] ([phone]), Lunes a [place]ier[place], 8am-5pm, para obtener información sobre los medicamentos y la leche maternal.  La anders de seguimiento con la pediatra debe ser 2 días después de salir del hospital. El bebé deberia jeffrey ganado el peso de nacimiento cuando tiene 10-14 días de juan manuel.

## 2022-03-23 NOTE — PLAN OF CARE
POC reviewed with pt; pt verbalized acceptance and understanding.  Pt reports pain goal met with prescribed medications per MD.  Ambulating freely in room.  Tolerating regular diet.  Family support noted at bedside. VSS. NAD noted.Will continue to monitor.

## 2022-03-23 NOTE — PROGRESS NOTES
Gisel Hernandez is a 26 y.o. female   PPD #1 status post  Spontaneous vaginal delivery. has no problems, feels well, no complaints  Patient reports none abd pain that is adequately Relieved by Oral pain medications. Lochia is mild to moderate  and decreasing, Voiding without difficulty Ambulating with no difficulty, has passed flatus, has not had BM,  Patient does plan to breast feed.    Objective:       BP (!) 95/48 (BP Location: Left arm, Patient Position: Lying)   Pulse 80   Temp 99.5 °F (37.5 °C) (Oral)   Resp 16   LMP 07/15/2021 (Exact Date)   SpO2 100%   Breastfeeding Yes   Vitals:    03/23/22 0030 03/23/22 0100 03/23/22 0200 03/23/22 0825   BP: (!) 100/58 (!) 107/55 (!) 113/54 (!) 95/48   BP Location:    Left arm   Patient Position:    Lying   Pulse: 108 81 77 80   Resp: 18 18 18 16   Temp:   99 °F (37.2 °C) 99.5 °F (37.5 °C)   TempSrc:   Oral Oral   SpO2: 97% 98%  100%     General:   alert, appears stated age and cooperative   Lungs:   clear to auscultation bilaterally   Heart:   regular rate and rhythm, S1, S2 normal, no murmur, click, rub or gallop   Abdomen:  soft, non-tender; bowel sounds normal; no masses,  no organomegaly   Uterus:  firm located at the umblicus.        Extremities: peripheral pulses normal, no pedal edema, no clubbing or cyanosis     Lab Review  Recent Results (from the past 72 hour(s))   COVID-19 Rapid Screening    Collection Time: 03/21/22  7:20 PM   Result Value Ref Range    SARS-CoV-2 RNA, Amplification, Qual Negative Negative   CBC with Auto Differential    Collection Time: 03/21/22  8:22 PM   Result Value Ref Range    WBC 8.83 3.90 - 12.70 K/uL    RBC 3.90 (L) 4.00 - 5.40 M/uL    Hemoglobin 11.7 (L) 12.0 - 16.0 g/dL    Hematocrit 34.5 (L) 37.0 - 48.5 %    MCV 89 82 - 98 fL    MCH 30.0 27.0 - 31.0 pg    MCHC 33.9 32.0 - 36.0 g/dL    RDW 14.7 (H) 11.5 - 14.5 %    Platelets 290 150 - 450 K/uL    MPV 11.1 9.2 - 12.9 fL    Immature Granulocytes 0.3 0.0 - 0.5 %    Gran #  (ANC) 6.3 1.8 - 7.7 K/uL    Immature Grans (Abs) 0.03 0.00 - 0.04 K/uL    Lymph # 1.6 1.0 - 4.8 K/uL    Mono # 0.8 0.3 - 1.0 K/uL    Eos # 0.0 0.0 - 0.5 K/uL    Baso # 0.03 0.00 - 0.20 K/uL    nRBC 0 0 /100 WBC    Gran % 71.8 38.0 - 73.0 %    Lymph % 18.3 18.0 - 48.0 %    Mono % 8.8 4.0 - 15.0 %    Eosinophil % 0.5 0.0 - 8.0 %    Basophil % 0.3 0.0 - 1.9 %    Differential Method Automated    Comprehensive metabolic panel    Collection Time: 03/21/22  8:22 PM   Result Value Ref Range    Sodium 137 136 - 145 mmol/L    Potassium 3.7 3.5 - 5.1 mmol/L    Chloride 107 95 - 110 mmol/L    CO2 23 23 - 29 mmol/L    Glucose 85 70 - 110 mg/dL    BUN 7 6 - 20 mg/dL    Creatinine 0.6 0.5 - 1.4 mg/dL    Calcium 8.6 (L) 8.7 - 10.5 mg/dL    Total Protein 7.0 6.0 - 8.4 g/dL    Albumin 3.1 (L) 3.5 - 5.2 g/dL    Total Bilirubin 0.3 0.1 - 1.0 mg/dL    Alkaline Phosphatase 136 (H) 55 - 135 U/L    AST 20 10 - 40 U/L    ALT 18 10 - 44 U/L    Anion Gap 7 (L) 8 - 16 mmol/L    eGFR if African American >60 >60 mL/min/1.73 m^2    eGFR if non African American >60 >60 mL/min/1.73 m^2   Type & Screen, Labor & Delivery    Collection Time: 03/21/22  8:22 PM   Result Value Ref Range    Group & Rh A POS     Indirect Kvng NEG    RAPID HIV (SUDS) 1 and 2 Antibodies    Collection Time: 03/21/22  8:22 PM   Result Value Ref Range    HIV Rapid Testing Non-Reactive Negative   CBC auto differential    Collection Time: 03/23/22  7:26 AM   Result Value Ref Range    WBC 17.89 (H) 3.90 - 12.70 K/uL    RBC 2.92 (L) 4.00 - 5.40 M/uL    Hemoglobin 8.9 (L) 12.0 - 16.0 g/dL    Hematocrit 25.7 (L) 37.0 - 48.5 %    MCV 88 82 - 98 fL    MCH 30.5 27.0 - 31.0 pg    MCHC 34.6 32.0 - 36.0 g/dL    RDW 14.8 (H) 11.5 - 14.5 %    Platelets 217 150 - 450 K/uL    MPV 11.6 9.2 - 12.9 fL    Immature Granulocytes 0.5 0.0 - 0.5 %    Gran # (ANC) 14.6 (H) 1.8 - 7.7 K/uL    Immature Grans (Abs) 0.09 (H) 0.00 - 0.04 K/uL    Lymph # 1.3 1.0 - 4.8 K/uL    Mono # 1.9 (H) 0.3 - 1.0 K/uL     Eos # 0.1 0.0 - 0.5 K/uL    Baso # 0.03 0.00 - 0.20 K/uL    nRBC 0 0 /100 WBC    Gran % 81.3 (H) 38.0 - 73.0 %    Lymph % 7.3 (L) 18.0 - 48.0 %    Mono % 10.4 4.0 - 15.0 %    Eosinophil % 0.3 0.0 - 8.0 %    Basophil % 0.2 0.0 - 1.9 %    Differential Method Automated        I/O    Intake/Output Summary (Last 24 hours) at 3/23/2022 1132  Last data filed at 3/23/2022 0020  Gross per 24 hour   Intake --   Output 1650 ml   Net -1650 ml        Assessment:     Patient Active Problem List   Diagnosis     (normal spontaneous vaginal delivery)    40 weeks gestation of pregnancy        Plan:   1. Patient doing well. Continue routine management and advances.  2. Continue PO pain meds. Pain well controlled.  3. H/h 8.9/25.7.   4. Encourage ambulation.     Woody Bobo M.D.   OB/GYN    3/23/2022

## 2022-03-23 NOTE — LACTATION NOTE
Rounded on couplet. Mother reports BR going well. Denies pain or difficulty latching. Reports planning to do both BR/FF. Discussed adequacy of colostrum, infant's stomach size, supply/demand. Discussed benefits of exclusive BR and potential risks of FF including decreased milk supply, flow dependence, nipple confusion. Discussed that we would support mother's decision after education. Declines BR assistance at this time. Falling asleep during visit - requests to finish teaching later. Supported mother's request and encouraged to call PRN. Updated COLTON Butterfield

## 2022-03-23 NOTE — NURSING
Assisted pt up to bathroom. Pt able to ambulate independently. Pt able to void 300cc. wanda care done, gown changed, tucks, new peripad, and mesh panties applied. Once back to bed, epidural catheter removed by RN- tip intact and no redness noted at site. Vaginal bleeding scant. Fundus firm at umbilicus.

## 2022-03-23 NOTE — L&D DELIVERY NOTE
[facility] - Labor & Delivery  Vaginal Delivery   Operative Note    DELIVERY NOTE:    After complete dilatation and +2 station  Patient pushes x 15 minutes , delivering    One viable infant sex   ( - )M / ( x) F    , in OA position   Umbilical cord was clamped and cut  , cord  blood sample was  collected   Uterine cavity manually revised for placenta.membranes and clots     Nuchal cord= NO (x)  YES (-)  Shoulder dystocia= NO   Apgar 9/9  Amniotic fluid= clear   Placenta= normal intact , complete   Umbilical Cord= 3 vessels     No cervical tears   Episiotomy =         Yes ( - )      NO  (  X)   Perineal tears =     Yes( x)    NO  ( - ) first degree repaired CC 2--0   CT1    Vaginal tears=       Yes ( - )     NO  ( X)    EBL =  300cc  Sponge, lap, needle counts were correct   Vaginal sweep performed     No complications     Woody Bobo M.D.   OB/GYN        Indications:  (normal spontaneous vaginal delivery)  Pregnancy complicated by:   Patient Active Problem List   Diagnosis     (normal spontaneous vaginal delivery)    40 weeks gestation of pregnancy     Admitting GA: 40w0d    Delivery Information for Hai Hernandez    Birth information:  YOB: 2020   Time of birth: 10:46 PM   Sex: female   Head Delivery Date/Time: 3/22/2022 10:46 PM   Delivery type: Vaginal, Spontaneous   Gestational Age: 40w0d    Delivery Providers    Delivering clinician:            Measurements    Weight:   Length:          Apgars    Living status:   Apgars:  1 min.:  5 min.:  10 min.:  15 min.:  20 min.:    Skin color:         Heart rate:         Reflex irritability:         Muscle tone:         Respiratory effort:         Total:                Operative Delivery    Forceps attempted?: No  Vacuum extractor attempted?: No         Shoulder Dystocia    Shoulder dystocia present?: No           Presentation    Presentation: Vertex  Position: Left Occiput Anterior           Interventions/Resuscitation         Cord    No  data filed       Placenta    Placenta delivery date/time:   Placenta removal:            Labor Events:       labor: No     Labor Onset Date/Time:         Dilation Complete Date/Time:         Start Pushing Date/Time:         Start Pushing Date/Time:       Rupture Date/Time: 22  0850         Rupture type:          Fluid Amount:       Fluid Color: Clear      Fluid Odor:       Membrane Status: ARM (Artificial Rupture)               steroids: None     Antibiotics given for GBS: No     Induction: misoprostol     Indications for induction:  Elective     Augmentation: oxytocin;amniotomy     Indications for augmentation: Ineffective Contraction Pattern     Labor complications: None     Additional complications:          Cervical ripening: 3/21/2022 8:38 PM      Misoprostol          Delivery:      Episiotomy: None     Indication for Episiotomy:       Perineal Lacerations: 1st Repaired:      Periurethral Laceration:   Repaired:     Labial Laceration:   Repaired:     Sulcus Laceration:   Repaired:     Vaginal Laceration:   Repaired:     Cervical Laceration:   Repaired:     Repair suture:       Repair # of packets:       Last Value - EBL - Nursing (mL):       Sum - EBL - Nursing (mL): 0     Last Value - EBL - Anesthesia (mL):      Calculated QBL (mL):       Vaginal Sweep Performed: Yes     Surgicount Correct: Yes       Other providers:       Anesthesia    Method: Spinal, Epidural          Details (if applicable):  Trial of Labor      Categorization:      Priority:     Indications for :     Incision Type:       Additional  information:  Forceps:    Vacuum:    Breech:    Observed anomalies    Other (Comments):           Woody Bobo M.D.   OB/GYN    3/22/2022

## 2022-03-23 NOTE — NURSING
Pt transferred to mother/baby unit in stable condition via wheelchair. VS stable and vaginal bleeding light in amount. Bedside report given to COLTON Robles.

## 2022-03-23 NOTE — PLAN OF CARE
Patient ambulating freely in room.  Lochia rubra &  Light.  Fundus firm at umbilicus. Tolerating regular diet.  Voiding and passing flatus. Questions answered/encouraged; reassurance provided.  Pt states pain goal met with prescribed medications per MD.  Bonding with baby AEB holding, breastfeeding, dressing, and changing baby's diaper. Smiles appropriately at baby. Family support noted at bedside. POC reviewed with pt; able to verbalize acceptance and understanding. VS stable. Call light in reach, side rails up x2, nonskid socks on, bed in lowest position with wheels locked.  Remains free from falls and/or injury. NAD noted.  Will continue to monitor.

## 2022-03-23 NOTE — LACTATION NOTE
"  [facility] - Mother & Baby  Lactation Note - Mom    SUMMARY     Maternal Assessment    Breast Density: Bilateral:, soft  Nipples: Bilateral:, everted, graspable ("a little big" per mom)  Left Nipple Symptoms: other (see comments) (denies pain)  Right Nipple Symptoms: other (see comments) (denies pain)      LATCH Score     n/a    Breasts WDL    Breast WDL: WDL  Left Nipple Symptoms: other (see comments) (denies pain)  Right Nipple Symptoms: other (see comments) (denies pain)    Maternal Infant Feeding    Maternal Preparation: breast care, hand hygiene  Maternal Emotional State: relaxed, independent, other (see comments) (drowsy)  Pain with Feeding: no  Comfort Measures Following Feeding: air-drying encouraged, expressed milk applied    Lactation Referrals    Lactation Referrals: other (see comments) (resources in BR guide)    Lactation Interventions    Breast Care: Breastfeeding: breast milk to nipples, open to air  Breastfeeding Assistance: support offered, feeding cue recognition promoted, feeding on demand promoted  Breast Care: Breastfeeding: breast milk to nipples, open to air  Breastfeeding Assistance: support offered, feeding cue recognition promoted, feeding on demand promoted  Breastfeeding Support: diary/feeding log utilized, encouragement provided, infant-mother separation minimized       Breastfeeding Session         Maternal Information                 "

## 2022-03-23 NOTE — PROGRESS NOTES
DATE: 3/22/2022    S: 26 y.o.  at 40w0d   O:   BP: (!) 120/56    FHT: 145-155 x; Mod variability , Cat 1   [place]/[facility]: q 2-3 minutes   SVE: 7/ 90 -3         ASSESSMENT: 40w0d   Patient Active Problem List   Diagnosis     (normal spontaneous vaginal delivery)    40 weeks gestation of pregnancy       PLAN:    Continue Close Maternal/Fetal Monitoring  Pitocin Augmentation per protocol  Recheck 2 hours or PRN    Woody Bobo M.D.   OB/GYN

## 2022-03-23 NOTE — NURSING
MD calling for update on pt. MD informed SVE just now was 7 cm dilated. MD informed pitocin infusing at 14 milliunits/minute and MVU's are aququate per [facility]. MD informed pt repositioned to side and peanut ball in place. MD states to SVE again in 2 hours and call with update,

## 2022-03-23 NOTE — LACTATION NOTE
This note was copied from a baby's chart.  Information provided on benefits of exclusive breastfeeding, supply and demand, adequacy of colostrum, feeding frequency and normal  feeding patterns. Informed about risks of formula feeding, nipple confusion, and decreased milk supply. After education, mother still chooses to formula feed.      Formula feeding guide given and explained. Handouts included in the guide are as follows: Safe Bottle Feeding, WIC- Let Your Baby Set the Pace for Bottle Feeding, Formula Feeding Record, WISE- formula feeding, Managing Non-nursing Engorgement, Community Resources, & Baby Feeding Cues (signs). Instructed to feed on demand/cue, 8 or more times in 24 hours, utilizing paced bottle feeding technique. Feed baby until fullness cues observed. Questions/concenrs answered. Mother verbalizes understanding.

## 2022-03-24 VITALS
DIASTOLIC BLOOD PRESSURE: 54 MMHG | SYSTOLIC BLOOD PRESSURE: 103 MMHG | OXYGEN SATURATION: 98 % | HEART RATE: 71 BPM | RESPIRATION RATE: 18 BRPM | TEMPERATURE: 97 F

## 2022-03-24 PROCEDURE — 25000003 PHARM REV CODE 250: Performed by: OBSTETRICS & GYNECOLOGY

## 2022-03-24 PROCEDURE — 99238 PR HOSPITAL DISCHARGE DAY,<30 MIN: ICD-10-PCS | Mod: ,,, | Performed by: OBSTETRICS & GYNECOLOGY

## 2022-03-24 PROCEDURE — 99238 HOSP IP/OBS DSCHRG MGMT 30/<: CPT | Mod: ,,, | Performed by: OBSTETRICS & GYNECOLOGY

## 2022-03-24 RX ADMIN — NAPROXEN 500 MG: 500 TABLET ORAL at 04:03

## 2022-03-24 NOTE — DISCHARGE SUMMARY
Delivery Discharge Summary  Obstetrics      Primary OB Clinician: Woody Bobo    Admission date: 3/21/2022  Discharge date: 2022    Admit Dx:   Patient Active Problem List   Diagnosis     (normal spontaneous vaginal delivery)    40 weeks gestation of pregnancy     Discharge Dx:   Patient Active Problem List   Diagnosis     (normal spontaneous vaginal delivery)    40 weeks gestation of pregnancy       Procedure:     No results for input(s): WBC, RBC, HGB, HCT, PLT, MCV, MCH, MCHC in the last 24 hours.    Hospital Course:  Pt is a 26 y.o. now , PPD # 2 was admitted on 3/21/2022 . On initial assessment, vital signs were stable and physical exam was Normal.   Infant was in (x)cephalic  / (-) breech   presentation. Patient was subsequently admitted to labor and delivery unit with signed consents.  Patient delivered a single viable  female. Please see delivery note for further details. Pt was in stable condition post delivery and was transferred to the Mother-Baby Unit. Her postpartum course was uncomplicated. On discharge day, patient's pain is well  controlled with oral pain medications. Pt is tolerating ambulation without SOB or CP, and PO diet without N/V. Reports lochia is minimal in degree. Denies any HA, vision changes, F/C, LE swelling. Denies any breast pain/soreness.  Pt in stable condition and ready for discharge, instructed to continue pain medications  and to follow up in the OB clinic in 4 weeks with       Delivery:    Episiotomy: None   Lacerations: 1st   Repair suture:     Repair # of packets:     Blood loss (ml):       Birth information:  YOB: 2020   Time of birth: 10:46 PM   Sex: female   Delivery type: Vaginal, Spontaneous   Gestational Age: 40w0d    Delivery Clinician:      Other providers:       Additional  information:  Forceps:    Vacuum:    Breech:    Observed anomalies      Living?:           APGARS  One minute Five minutes Ten  minutes   Skin color:         Heart rate:         Grimace:         Muscle tone:         Breathing:         Totals: 9  9        Placenta: Delivered:       appearance      Patient Instructions:   Current Discharge Medication List      START taking these medications    Details   naproxen (NAPROSYN) 500 MG tablet Take 1 tablet (500 mg total) by mouth every 8 (eight) hours as needed (Cramping).  Qty: 25 tablet, Refills: 0         CONTINUE these medications which have NOT CHANGED    Details   promethazine (PHENERGAN) 25 MG tablet Take 1 tablet (25 mg total) by mouth every 6 (six) hours as needed for Nausea.  Qty: 25 tablet, Refills: 0      pyridoxine, vitamin B6, (B-6) 100 MG Tab Take 1 tablet (100 mg total) by mouth 3 (three) times daily before meals.  Qty: 60 tablet, Refills: 3             Patient is Discharged  home today   General recommendations and alarm signs are given  Pelvic rest   Follow up with Dr Bobo  in  4   weeks   Rx sent electronically  To pharmacy on file   All questions answered       Woody Bobo M.D.   OB/GYN  3/24/2022

## 2022-03-24 NOTE — PLAN OF CARE
SW made attempt of telephonic contact with patient x 2 to complete discharge planning assessment. The phone was answered by a male on both attempts and then the calls were disconnected.          03/24/22 0907   OB Discharge Planning Assessment   Assessment Type Discharge Planning Assessment

## 2022-03-24 NOTE — LACTATION NOTE
[facility]  Lactation Note - Mom    SUMMARY     Maternal Assessment    Breast Size Issue: none  Breast Shape: Bilateral:, round  Breast Density: Bilateral:, soft  Areola: Bilateral:, elastic  Nipples: Bilateral:, everted, graspable  Left Nipple Symptoms: bruised, scabbed, tender  Right Nipple Symptoms: bruised, scabbed, tender      LATCH Score         Breasts WDL    Breast WDL: WDL except, nipple symptoms, all  Left Breast Symptoms: soft, nontender  Right Breast Symptoms: soft, nontender  Left Nipple Symptoms: bruised, scabbed, tender  Right Nipple Symptoms: bruised, scabbed, tender    Maternal Infant Feeding    Maternal Preparation: breast care, hand hygiene  Maternal Emotional State: assist needed, relaxed  Infant Positioning: clutch/football  Signs of Milk Transfer: audible swallow, infant jaw motion present  Pain with Feeding: yes (2/10)  Pain Location: nipples, bilateral  Pain Description: soreness  Comfort Measures Before/During Feeding: infant position adjusted, latch adjusted, maternal position adjusted  Comfort Measures Following Feeding: air-drying encouraged, expressed milk applied, other (see comments) (has lanolin, encouraged gel pads)  Latch Assistance: yes    Lactation Referrals    Lactation Referrals: outpatient lactation program, pediatric care provider  Outpatient Lactation Program Lactation Follow-up Date/Time: call lact ctr PRN  Pediatric Care Provider Lactation Follow-up Date/Time: f/u as directed by NNP    Lactation Interventions    Breast Care: Breastfeeding: breast milk to nipples, lanolin to nipples, manual expression to soften breast, milk massaged towards nipple, warm shower encouraged  Breastfeeding Assistance: assisted with positioning, feeding cue recognition promoted, feeding on demand promoted, feeding session observed, hand expression verified, infant latch-on verified, infant stimulated to wakeful state, infant suck/swallow verified, support offered  Breast Care:  Breastfeeding: breast milk to nipples, lanolin to nipples, manual expression to soften breast, milk massaged towards nipple, warm shower encouraged  Breastfeeding Assistance: assisted with positioning, feeding cue recognition promoted, feeding on demand promoted, feeding session observed, hand expression verified, infant latch-on verified, infant stimulated to wakeful state, infant suck/swallow verified, support offered  Breastfeeding Support: encouragement provided, maternal rest encouraged, maternal nutrition promoted, maternal hydration promoted, lactation counseling provided       Breastfeeding Session    Infant Positioning: clutch/football  Signs of Milk Transfer: audible swallow, infant jaw motion present    Maternal Information

## 2022-03-24 NOTE — PLAN OF CARE
Mother will breastfeed on cue 8 or more times in 24 hours. Will supplement with formula as needed per maternal choice. Will record voids/stools. Will monitor baby for signs of adequate feedings. Will call for assistance if needed. Family supportive at bedside.

## 2022-03-24 NOTE — PROGRESS NOTES
Gisel Hernandez is a 26 y.o. female   PPD #2 status post  Spontaneous vaginal delivery. has no problems, feels well, no complaints  Patient reports none abd pain that is adequately Relieved by Oral pain medications. Lochia is mild to moderate  and decreasing, Voiding without difficulty Ambulating with no difficulty, has passed flatus, has not had BM,  Patient does plan to breast feed.    Objective:       BP (!) 107/55 (BP Location: Left arm, Patient Position: Sitting)   Pulse 70   Temp 98.3 °F (36.8 °C) (Oral)   Resp 16   LMP 07/15/2021 (Exact Date)   SpO2 97%   Breastfeeding Yes   Vitals:    03/23/22 1200 03/23/22 1600 03/23/22 2122 03/24/22 0159   BP: (!) 103/53 95/61 110/60 (!) 107/55   BP Location: Left arm Right arm Right arm Left arm   Patient Position: Sitting Sitting Sitting Sitting   Pulse: 81 76 82 70   Resp: 16 16 18 16   Temp: 98.3 °F (36.8 °C) 97.5 °F (36.4 °C) 98.6 °F (37 °C) 98.3 °F (36.8 °C)   TempSrc: Oral Oral Oral Oral   SpO2: 99% 99%  97%     General:   alert, appears stated age and cooperative   Lungs:   clear to auscultation bilaterally   Heart:   regular rate and rhythm, S1, S2 normal, no murmur, click, rub or gallop   Abdomen:  soft, non-tender; bowel sounds normal; no masses,  no organomegaly   Uterus:  firm located at the umblicus.        Extremities: peripheral pulses normal, no pedal edema, no clubbing or cyanosis     Lab Review  Recent Results (from the past 72 hour(s))   COVID-19 Rapid Screening    Collection Time: 03/21/22  7:20 PM   Result Value Ref Range    SARS-CoV-2 RNA, Amplification, Qual Negative Negative   CBC with Auto Differential    Collection Time: 03/21/22  8:22 PM   Result Value Ref Range    WBC 8.83 3.90 - 12.70 K/uL    RBC 3.90 (L) 4.00 - 5.40 M/uL    Hemoglobin 11.7 (L) 12.0 - 16.0 g/dL    Hematocrit 34.5 (L) 37.0 - 48.5 %    MCV 89 82 - 98 fL    MCH 30.0 27.0 - 31.0 pg    MCHC 33.9 32.0 - 36.0 g/dL    RDW 14.7 (H) 11.5 - 14.5 %    Platelets 290 150 - 450  K/uL    MPV 11.1 9.2 - 12.9 fL    Immature Granulocytes 0.3 0.0 - 0.5 %    Gran # (ANC) 6.3 1.8 - 7.7 K/uL    Immature Grans (Abs) 0.03 0.00 - 0.04 K/uL    Lymph # 1.6 1.0 - 4.8 K/uL    Mono # 0.8 0.3 - 1.0 K/uL    Eos # 0.0 0.0 - 0.5 K/uL    Baso # 0.03 0.00 - 0.20 K/uL    nRBC 0 0 /100 WBC    Gran % 71.8 38.0 - 73.0 %    Lymph % 18.3 18.0 - 48.0 %    Mono % 8.8 4.0 - 15.0 %    Eosinophil % 0.5 0.0 - 8.0 %    Basophil % 0.3 0.0 - 1.9 %    Differential Method Automated    Comprehensive metabolic panel    Collection Time: 03/21/22  8:22 PM   Result Value Ref Range    Sodium 137 136 - 145 mmol/L    Potassium 3.7 3.5 - 5.1 mmol/L    Chloride 107 95 - 110 mmol/L    CO2 23 23 - 29 mmol/L    Glucose 85 70 - 110 mg/dL    BUN 7 6 - 20 mg/dL    Creatinine 0.6 0.5 - 1.4 mg/dL    Calcium 8.6 (L) 8.7 - 10.5 mg/dL    Total Protein 7.0 6.0 - 8.4 g/dL    Albumin 3.1 (L) 3.5 - 5.2 g/dL    Total Bilirubin 0.3 0.1 - 1.0 mg/dL    Alkaline Phosphatase 136 (H) 55 - 135 U/L    AST 20 10 - 40 U/L    ALT 18 10 - 44 U/L    Anion Gap 7 (L) 8 - 16 mmol/L    eGFR if African American >60 >60 mL/min/1.73 m^2    eGFR if non African American >60 >60 mL/min/1.73 m^2   Type & Screen, Labor & Delivery    Collection Time: 03/21/22  8:22 PM   Result Value Ref Range    Group & Rh A POS     Indirect Kvng NEG    RAPID HIV (SUDS) 1 and 2 Antibodies    Collection Time: 03/21/22  8:22 PM   Result Value Ref Range    HIV Rapid Testing Non-Reactive Negative   CBC auto differential    Collection Time: 03/23/22  7:26 AM   Result Value Ref Range    WBC 17.89 (H) 3.90 - 12.70 K/uL    RBC 2.92 (L) 4.00 - 5.40 M/uL    Hemoglobin 8.9 (L) 12.0 - 16.0 g/dL    Hematocrit 25.7 (L) 37.0 - 48.5 %    MCV 88 82 - 98 fL    MCH 30.5 27.0 - 31.0 pg    MCHC 34.6 32.0 - 36.0 g/dL    RDW 14.8 (H) 11.5 - 14.5 %    Platelets 217 150 - 450 K/uL    MPV 11.6 9.2 - 12.9 fL    Immature Granulocytes 0.5 0.0 - 0.5 %    Gran # (ANC) 14.6 (H) 1.8 - 7.7 K/uL    Immature Grans (Abs) 0.09 (H)  0.00 - 0.04 K/uL    Lymph # 1.3 1.0 - 4.8 K/uL    Mono # 1.9 (H) 0.3 - 1.0 K/uL    Eos # 0.1 0.0 - 0.5 K/uL    Baso # 0.03 0.00 - 0.20 K/uL    nRBC 0 0 /100 WBC    Gran % 81.3 (H) 38.0 - 73.0 %    Lymph % 7.3 (L) 18.0 - 48.0 %    Mono % 10.4 4.0 - 15.0 %    Eosinophil % 0.3 0.0 - 8.0 %    Basophil % 0.2 0.0 - 1.9 %    Differential Method Automated        I/O  No intake or output data in the 24 hours ending 22     Assessment:     Patient Active Problem List   Diagnosis     (normal spontaneous vaginal delivery)    40 weeks gestation of pregnancy        Plan:   1. Patient doing well. Continue routine management and advances.  2. Continue PO pain meds. Pain well controlled.  3. H/h 8.9/25.7.   4. Encourage ambulation.     Patient is Discharged  home today   General recommendations and alarm signs are given  Pelvic rest   Follow up with Dr Bobo  in  ( )2  -  ( x) 4   weeks   Rx sent electronically  To pharmacy on file   All questions answered     Woody Bobo M.D.   OB/GYN          Woody Bobo M.D.   OB/GYN    3/24/2022

## 2022-03-24 NOTE — PLAN OF CARE
POC reviewed with pt; pt verbalized acceptance and understanding.  Pt reports pain goal met with prescribed medications per MD.  Ambulating freely in room.  Tolerating regular diet. Family support noted at bedside. VSS. NAD noted. R Will continue to monitor.

## 2022-04-19 ENCOUNTER — POSTPARTUM VISIT (OUTPATIENT)
Dept: OBSTETRICS AND GYNECOLOGY | Facility: CLINIC | Age: 27
End: 2022-04-19
Payer: MEDICAID

## 2022-04-19 ENCOUNTER — PROCEDURE VISIT (OUTPATIENT)
Dept: OBSTETRICS AND GYNECOLOGY | Facility: CLINIC | Age: 27
End: 2022-04-19
Payer: MEDICAID

## 2022-04-19 VITALS — SYSTOLIC BLOOD PRESSURE: 92 MMHG | WEIGHT: 147.94 LBS | DIASTOLIC BLOOD PRESSURE: 54 MMHG

## 2022-04-19 DIAGNOSIS — Z30.017 NEXPLANON INSERTION: ICD-10-CM

## 2022-04-19 PROCEDURE — 11981 INSERTION DRUG DLVR IMPLANT: CPT | Mod: PBBFAC,PO | Performed by: OBSTETRICS & GYNECOLOGY

## 2022-04-19 PROCEDURE — 99999 PR PBB SHADOW E&M-EST. PATIENT-LVL I: CPT | Mod: PBBFAC,,, | Performed by: OBSTETRICS & GYNECOLOGY

## 2022-04-19 PROCEDURE — 1111F PR DISCHARGE MEDS RECONCILED W/ CURRENT OUTPATIENT MED LIST: ICD-10-PCS | Mod: CPTII,,, | Performed by: OBSTETRICS & GYNECOLOGY

## 2022-04-19 PROCEDURE — 11981 PR INSERT, DRUG DELIVERY IMPLANT, BIORESORB/BIODEGR/NON-BIODEGR: ICD-10-PCS | Mod: S$PBB,,, | Performed by: OBSTETRICS & GYNECOLOGY

## 2022-04-19 PROCEDURE — 99999 PR PBB SHADOW E&M-EST. PATIENT-LVL I: ICD-10-PCS | Mod: PBBFAC,,, | Performed by: OBSTETRICS & GYNECOLOGY

## 2022-04-19 PROCEDURE — 59430 PR CARE AFTER DELIVERY ONLY: ICD-10-PCS | Mod: S$PBB,,, | Performed by: OBSTETRICS & GYNECOLOGY

## 2022-04-19 PROCEDURE — 1111F DSCHRG MED/CURRENT MED MERGE: CPT | Mod: CPTII,,, | Performed by: OBSTETRICS & GYNECOLOGY

## 2022-04-19 PROCEDURE — 11981 INSERTION DRUG DLVR IMPLANT: CPT | Mod: S$PBB,,, | Performed by: OBSTETRICS & GYNECOLOGY

## 2022-04-19 PROCEDURE — 99211 OFF/OP EST MAY X REQ PHY/QHP: CPT | Mod: PBBFAC,PO | Performed by: OBSTETRICS & GYNECOLOGY

## 2022-04-20 RX ADMIN — ETONOGESTREL 68 MG: 68 IMPLANT SUBCUTANEOUS at 09:04

## 2022-04-20 NOTE — PROGRESS NOTES
CC: Post-partum follow-up    Gisel Hernandez is a 27 y.o. female  who presents for post-partum visit.    She is S/P a .  Delivery Date=    She and the baby are doing well.  No pain.  No fever.   No bowel / bladder complaints.    Delivery MD: Jeramie    Breast Feeding: YES  Depression: NO  Contraception: no method desires Nexplanon     Pregnancy was complicated by:  Nothing     LMP 07/15/2021 (Exact Date)     ROS:  GENERAL: No fever, chills, fatigability.  VULVAR: No pain, no lesions and no itching.  VAGINAL: No relaxation, no itching, no discharge, no abnormal bleeding and no lesions.  ABDOMEN: No abdominal pain. Denies nausea. Denies vomiting. No diarrhea. No constipation  BREAST: Denies pain. No lumps. No discharge.  URINARY: No incontinence, no nocturia, no frequency and no dysuria.  CARDIOVASCULAR: No chest pain. No shortness of breath. No leg cramps.  NEUROLOGICAL: No headaches. No vision changes.    PHYSICAL EXAM:  Exam chaperoned by nurse  ABDOMEN:  Soft, non-tender, non-distended  VULVA:  Normal, no lesions  CERVIX:  Without lesions, polyps or tenderness.  UTERUS:  Normal size, shape, consistency, no mass or tenderness.  ADNEXA:  Normal in size without mass or tenderness    DX:   S/P     Doing well   Instructions / precautions reviewed  Contraceptive counseling    NEXPLANON  INSERTION:      PRE- INSERTION COUNSELING:  All contraceptive options were reviewed and the patient chooses NEXPLANON  Patients history was reviewed and there were no contraindications to Implanon.  The procedure and minimal risks of pain, bleeding, bruising and infection at the insertion site discussed. Possible irregular menstrual bleeding pattern versus amenorrhea was explained.  No protection against STDs discussed.  Written information provided; all questions answered and patient agrees to proceed.  Consent signed and scanned into computer.    EXAM:  With patient in supine position the nondominant arm was flexed at  the elbow and externally rotated.  The insertion site was identified 6-8 cm above the elbow crease at the inner side of the upper arm overlying the groove between biceps and triceps.  The insertion site was marked and a second gerard was placed 6-8 cm above the first.    PROCEDURE:  TIME OUT PERFORMED.  The insertion site was prepped with antiseptic and injected with 2 cc of 1% Xylocaine without epinephrine subq along the planned insertion canal.  Xylocaine subq along the planned insertion canal.  Using sterile technique the Implanon applicator was visually verified and removed from the blister pack.  The base of the applicator was gently tapped with the needle pointed up until the implant disappeared back into the needle and the needle cap was removed.  The needle tip was inserted bevel side up at a 20 degree angle to penetrate the skin.  The applicator was lowered parallel to the arm and the skin was tented with the needle.  The seal of the applicator was broken by pressing the obturator support and turned 90degrees.  The obturator tip was fixed in place on the arm with one hand and with the other hand the needle was slowly and fully retracted back along full length of the obturator.  The grooved tip of the obturator was visible inside the needle and the implant waspalpable after insertion.  A small adhesive bandage and then a pressure bandage was placed over the insertion site.  The patient tolerated the procedure well.    ASSESSMENT:  1. Contraception management / Implanon insertion.V25.0.    POST IMPLANON INSERTION COUNSELING:  Manage post Implanon placement arm pain with NSAIDs, Tylenol or Rx per Med[employer].  Keep arm elevated and apply intermittent ice packs to decrease pain and bruising for 24 Hours.  May remove bandage in 24 hours.  Implanon danger signs (worsening pain at insertion site, bleeding through bandage, redness and/or pus drainage at insertion site).  Removal in 3 years.    Counseling lasted  approximately 15 minutes and all her questions were answered.    FOLLOW-UP: with me in two weeks.    Woody Bobo M.D.         PLAN:  May resume normal activities  Return: for annual visit or PRN         Woody Bobo M.D.   OB/GYN    4/20/2022

## 2022-04-20 NOTE — PROCEDURES
NEXPLANON  INSERTION:      PRE- INSERTION COUNSELING:  All contraceptive options were reviewed and the patient chooses NEXPLANON  Patients history was reviewed and there were no contraindications to Implanon.  The procedure and minimal risks of pain, bleeding, bruising and infection at the insertion site discussed. Possible irregular menstrual bleeding pattern versus amenorrhea was explained.  No protection against STDs discussed.  Written information provided; all questions answered and patient agrees to proceed.  Consent signed and scanned into computer.    EXAM:  With patient in supine position the nondominant arm was flexed at the elbow and externally rotated.  The insertion site was identified 6-8 cm above the elbow crease at the inner side of the upper arm overlying the groove between biceps and triceps.  The insertion site was marked and a second gerard was placed 6-8 cm above the first.    PROCEDURE:  TIME OUT PERFORMED.  The insertion site was prepped with antiseptic and injected with 2 cc of 1% Xylocaine without epinephrine subq along the planned insertion canal.  Xylocaine subq along the planned insertion canal.  Using sterile technique the Implanon applicator was visually verified and removed from the blister pack.  The base of the applicator was gently tapped with the needle pointed up until the implant disappeared back into the needle and the needle cap was removed.  The needle tip was inserted bevel side up at a 20 degree angle to penetrate the skin.  The applicator was lowered parallel to the arm and the skin was tented with the needle.  The seal of the applicator was broken by pressing the obturator support and turned 90degrees.  The obturator tip was fixed in place on the arm with one hand and with the other hand the needle was slowly and fully retracted back along full length of the obturator.  The grooved tip of the obturator was visible inside the needle and the implant waspalpable after  insertion.  A small adhesive bandage and then a pressure bandage was placed over the insertion site.  The patient tolerated the procedure well.    ASSESSMENT:  1. Contraception management / Implanon insertion.V25.0.    POST IMPLANON INSERTION COUNSELING:  Manage post Implanon placement arm pain with NSAIDs, Tylenol or Rx per MedCard.  Keep arm elevated and apply intermittent ice packs to decrease pain and bruising for 24 Hours.  May remove bandage in 24 hours.  Implanon danger signs (worsening pain at insertion site, bleeding through bandage, redness and/or pus drainage at insertion site).  Removal in 3 years.    Counseling lasted approximately 15 minutes and all her questions were answered.    FOLLOW-UP: with me  For annual visit or MALIHA Bobo M.D.   OB/GYN

## 2023-02-27 PROBLEM — Z3A.40 40 WEEKS GESTATION OF PREGNANCY: Status: RESOLVED | Noted: 2022-03-22 | Resolved: 2023-02-27
